# Patient Record
Sex: MALE | Race: WHITE
[De-identification: names, ages, dates, MRNs, and addresses within clinical notes are randomized per-mention and may not be internally consistent; named-entity substitution may affect disease eponyms.]

---

## 2017-05-15 ENCOUNTER — HOSPITAL ENCOUNTER (EMERGENCY)
Dept: HOSPITAL 8 - ED | Age: 82
End: 2017-05-15
Payer: MEDICARE

## 2017-05-15 VITALS — BODY MASS INDEX: 23.96 KG/M2 | WEIGHT: 180.78 LBS | HEIGHT: 73 IN

## 2017-05-15 VITALS — DIASTOLIC BLOOD PRESSURE: 67 MMHG | SYSTOLIC BLOOD PRESSURE: 102 MMHG

## 2017-05-15 DIAGNOSIS — Z85.46: ICD-10-CM

## 2017-05-15 DIAGNOSIS — L50.9: Primary | ICD-10-CM

## 2017-05-15 DIAGNOSIS — I10: ICD-10-CM

## 2017-05-15 PROCEDURE — 99284 EMERGENCY DEPT VISIT MOD MDM: CPT

## 2017-05-15 PROCEDURE — 96372 THER/PROPH/DIAG INJ SC/IM: CPT

## 2017-05-15 PROCEDURE — 93005 ELECTROCARDIOGRAM TRACING: CPT

## 2017-06-04 ENCOUNTER — HOSPITAL ENCOUNTER (INPATIENT)
Dept: HOSPITAL 8 - ED | Age: 82
LOS: 6 days | Discharge: HOME | DRG: 312 | End: 2017-06-10
Attending: INTERNAL MEDICINE | Admitting: INTERNAL MEDICINE
Payer: MEDICARE

## 2017-06-04 VITALS — BODY MASS INDEX: 22.75 KG/M2 | WEIGHT: 167.99 LBS | HEIGHT: 72 IN

## 2017-06-04 VITALS — DIASTOLIC BLOOD PRESSURE: 66 MMHG | SYSTOLIC BLOOD PRESSURE: 126 MMHG

## 2017-06-04 VITALS — SYSTOLIC BLOOD PRESSURE: 145 MMHG | DIASTOLIC BLOOD PRESSURE: 83 MMHG

## 2017-06-04 DIAGNOSIS — Z85.810: ICD-10-CM

## 2017-06-04 DIAGNOSIS — R78.81: ICD-10-CM

## 2017-06-04 DIAGNOSIS — E86.0: ICD-10-CM

## 2017-06-04 DIAGNOSIS — C78.01: ICD-10-CM

## 2017-06-04 DIAGNOSIS — K52.9: ICD-10-CM

## 2017-06-04 DIAGNOSIS — Z79.899: ICD-10-CM

## 2017-06-04 DIAGNOSIS — Z90.49: ICD-10-CM

## 2017-06-04 DIAGNOSIS — Z85.46: ICD-10-CM

## 2017-06-04 DIAGNOSIS — I42.9: ICD-10-CM

## 2017-06-04 DIAGNOSIS — I13.0: ICD-10-CM

## 2017-06-04 DIAGNOSIS — T44.6X5A: ICD-10-CM

## 2017-06-04 DIAGNOSIS — K21.9: ICD-10-CM

## 2017-06-04 DIAGNOSIS — I50.22: ICD-10-CM

## 2017-06-04 DIAGNOSIS — Z80.0: ICD-10-CM

## 2017-06-04 DIAGNOSIS — I65.23: ICD-10-CM

## 2017-06-04 DIAGNOSIS — Z85.828: ICD-10-CM

## 2017-06-04 DIAGNOSIS — R91.1: ICD-10-CM

## 2017-06-04 DIAGNOSIS — Z95.3: ICD-10-CM

## 2017-06-04 DIAGNOSIS — N28.1: ICD-10-CM

## 2017-06-04 DIAGNOSIS — Z95.2: ICD-10-CM

## 2017-06-04 DIAGNOSIS — I49.5: ICD-10-CM

## 2017-06-04 DIAGNOSIS — Z80.7: ICD-10-CM

## 2017-06-04 DIAGNOSIS — Z66: ICD-10-CM

## 2017-06-04 DIAGNOSIS — N18.2: ICD-10-CM

## 2017-06-04 DIAGNOSIS — Z81.8: ICD-10-CM

## 2017-06-04 DIAGNOSIS — I95.1: Primary | ICD-10-CM

## 2017-06-04 DIAGNOSIS — Z79.82: ICD-10-CM

## 2017-06-04 DIAGNOSIS — T46.4X5A: ICD-10-CM

## 2017-06-04 DIAGNOSIS — T44.7X5A: ICD-10-CM

## 2017-06-04 DIAGNOSIS — Z87.891: ICD-10-CM

## 2017-06-04 DIAGNOSIS — Z95.0: ICD-10-CM

## 2017-06-04 LAB
AST SERPL-CCNC: 20 U/L (ref 15–37)
BUN SERPL-MCNC: 23 MG/DL (ref 7–18)
IS PT STATUS REG ER OR PRE ER?: NO

## 2017-06-04 PROCEDURE — 74177 CT ABD & PELVIS W/CONTRAST: CPT

## 2017-06-04 PROCEDURE — 87076 CULTURE ANAEROBE IDENT EACH: CPT

## 2017-06-04 PROCEDURE — 36415 COLL VENOUS BLD VENIPUNCTURE: CPT

## 2017-06-04 PROCEDURE — 76700 US EXAM ABDOM COMPLETE: CPT

## 2017-06-04 PROCEDURE — 83690 ASSAY OF LIPASE: CPT

## 2017-06-04 PROCEDURE — 93005 ELECTROCARDIOGRAM TRACING: CPT

## 2017-06-04 PROCEDURE — 80048 BASIC METABOLIC PNL TOTAL CA: CPT

## 2017-06-04 PROCEDURE — 93306 TTE W/DOPPLER COMPLETE: CPT

## 2017-06-04 PROCEDURE — 71010: CPT

## 2017-06-04 PROCEDURE — 80053 COMPREHEN METABOLIC PANEL: CPT

## 2017-06-04 PROCEDURE — 87324 CLOSTRIDIUM AG IA: CPT

## 2017-06-04 PROCEDURE — 87040 BLOOD CULTURE FOR BACTERIA: CPT

## 2017-06-04 PROCEDURE — 89055 LEUKOCYTE ASSESSMENT FECAL: CPT

## 2017-06-04 PROCEDURE — 96360 HYDRATION IV INFUSION INIT: CPT

## 2017-06-04 PROCEDURE — 71260 CT THORAX DX C+: CPT

## 2017-06-04 PROCEDURE — 85025 COMPLETE CBC W/AUTO DIFF WBC: CPT

## 2017-06-04 PROCEDURE — 85610 PROTHROMBIN TIME: CPT

## 2017-06-04 PROCEDURE — 84484 ASSAY OF TROPONIN QUANT: CPT

## 2017-06-04 PROCEDURE — 83605 ASSAY OF LACTIC ACID: CPT

## 2017-06-04 PROCEDURE — 81003 URINALYSIS AUTO W/O SCOPE: CPT

## 2017-06-04 PROCEDURE — 84439 ASSAY OF FREE THYROXINE: CPT

## 2017-06-04 PROCEDURE — 85730 THROMBOPLASTIN TIME PARTIAL: CPT

## 2017-06-04 PROCEDURE — 84443 ASSAY THYROID STIM HORMONE: CPT

## 2017-06-04 PROCEDURE — 83735 ASSAY OF MAGNESIUM: CPT

## 2017-06-04 PROCEDURE — 82040 ASSAY OF SERUM ALBUMIN: CPT

## 2017-06-04 PROCEDURE — 85379 FIBRIN DEGRADATION QUANT: CPT

## 2017-06-04 PROCEDURE — 93880 EXTRACRANIAL BILAT STUDY: CPT

## 2017-06-04 RX ADMIN — SUCRALFATE SCH GM: 1 TABLET ORAL at 20:17

## 2017-06-04 RX ADMIN — TAMSULOSIN HYDROCHLORIDE SCH MG: 0.4 CAPSULE ORAL at 20:17

## 2017-06-04 RX ADMIN — POTASSIUM CHLORIDE SCH MLS/HR: 2 INJECTION, SOLUTION, CONCENTRATE INTRAVENOUS at 19:41

## 2017-06-05 VITALS — DIASTOLIC BLOOD PRESSURE: 57 MMHG | SYSTOLIC BLOOD PRESSURE: 103 MMHG

## 2017-06-05 LAB
AST SERPL-CCNC: 18 U/L (ref 15–37)
BUN SERPL-MCNC: 19 MG/DL (ref 7–18)
IS PT STATUS REG ER OR PRE ER?: NO

## 2017-06-05 RX ADMIN — HEPARIN SODIUM SCH UNITS: 5000 INJECTION, SOLUTION INTRAVENOUS; SUBCUTANEOUS at 12:00

## 2017-06-05 RX ADMIN — HEPARIN SODIUM SCH UNITS: 5000 INJECTION, SOLUTION INTRAVENOUS; SUBCUTANEOUS at 22:00

## 2017-06-05 RX ADMIN — PANTOPRAZOLE SODIUM SCH MG: 40 TABLET, DELAYED RELEASE ORAL at 08:00

## 2017-06-05 RX ADMIN — SUCRALFATE SCH GM: 1 TABLET ORAL at 22:00

## 2017-06-05 RX ADMIN — ASPIRIN SCH MG: 81 TABLET, COATED ORAL at 08:00

## 2017-06-05 RX ADMIN — POTASSIUM CHLORIDE SCH MLS/HR: 2 INJECTION, SOLUTION, CONCENTRATE INTRAVENOUS at 12:00

## 2017-06-05 RX ADMIN — POTASSIUM CHLORIDE SCH MLS/HR: 2 INJECTION, SOLUTION, CONCENTRATE INTRAVENOUS at 02:48

## 2017-06-05 RX ADMIN — TAMSULOSIN HYDROCHLORIDE SCH MG: 0.4 CAPSULE ORAL at 22:00

## 2017-06-05 RX ADMIN — POTASSIUM CHLORIDE SCH MLS/HR: 2 INJECTION, SOLUTION, CONCENTRATE INTRAVENOUS at 16:00

## 2017-06-05 RX ADMIN — HEPARIN SODIUM SCH UNITS: 5000 INJECTION, SOLUTION INTRAVENOUS; SUBCUTANEOUS at 04:18

## 2017-06-05 RX ADMIN — HEPARIN SODIUM SCH UNITS: 5000 INJECTION, SOLUTION INTRAVENOUS; SUBCUTANEOUS at 04:10

## 2017-06-05 RX ADMIN — SUCRALFATE SCH GM: 1 TABLET ORAL at 08:00

## 2017-06-06 VITALS — SYSTOLIC BLOOD PRESSURE: 107 MMHG | DIASTOLIC BLOOD PRESSURE: 66 MMHG

## 2017-06-06 VITALS — SYSTOLIC BLOOD PRESSURE: 120 MMHG | DIASTOLIC BLOOD PRESSURE: 72 MMHG

## 2017-06-06 VITALS — DIASTOLIC BLOOD PRESSURE: 75 MMHG | SYSTOLIC BLOOD PRESSURE: 137 MMHG

## 2017-06-06 VITALS — SYSTOLIC BLOOD PRESSURE: 91 MMHG | DIASTOLIC BLOOD PRESSURE: 52 MMHG

## 2017-06-06 VITALS — DIASTOLIC BLOOD PRESSURE: 84 MMHG | SYSTOLIC BLOOD PRESSURE: 129 MMHG

## 2017-06-06 RX ADMIN — POTASSIUM CHLORIDE SCH MLS/HR: 2 INJECTION, SOLUTION, CONCENTRATE INTRAVENOUS at 09:00

## 2017-06-06 RX ADMIN — SUCRALFATE SCH GM: 1 TABLET ORAL at 21:47

## 2017-06-06 RX ADMIN — TAMSULOSIN HYDROCHLORIDE SCH MG: 0.4 CAPSULE ORAL at 21:47

## 2017-06-06 RX ADMIN — ASPIRIN SCH MG: 81 TABLET, COATED ORAL at 09:00

## 2017-06-06 RX ADMIN — SUCRALFATE SCH GM: 1 TABLET ORAL at 09:00

## 2017-06-06 RX ADMIN — HEPARIN SODIUM SCH UNITS: 5000 INJECTION, SOLUTION INTRAVENOUS; SUBCUTANEOUS at 04:00

## 2017-06-06 RX ADMIN — PANTOPRAZOLE SODIUM SCH MG: 40 TABLET, DELAYED RELEASE ORAL at 09:00

## 2017-06-06 RX ADMIN — HEPARIN SODIUM SCH UNITS: 5000 INJECTION, SOLUTION INTRAVENOUS; SUBCUTANEOUS at 21:48

## 2017-06-06 RX ADMIN — ASPIRIN SCH MG: 81 TABLET, COATED ORAL at 08:00

## 2017-06-06 RX ADMIN — CEFTRIAXONE SCH MLS/HR: 1 INJECTION, POWDER, FOR SOLUTION INTRAMUSCULAR; INTRAVENOUS at 21:48

## 2017-06-06 RX ADMIN — POTASSIUM CHLORIDE SCH MLS/HR: 2 INJECTION, SOLUTION, CONCENTRATE INTRAVENOUS at 16:23

## 2017-06-07 VITALS — SYSTOLIC BLOOD PRESSURE: 146 MMHG | DIASTOLIC BLOOD PRESSURE: 88 MMHG

## 2017-06-07 VITALS — SYSTOLIC BLOOD PRESSURE: 90 MMHG | DIASTOLIC BLOOD PRESSURE: 81 MMHG

## 2017-06-07 VITALS — SYSTOLIC BLOOD PRESSURE: 141 MMHG | DIASTOLIC BLOOD PRESSURE: 89 MMHG

## 2017-06-07 VITALS — SYSTOLIC BLOOD PRESSURE: 86 MMHG | DIASTOLIC BLOOD PRESSURE: 51 MMHG

## 2017-06-07 VITALS — DIASTOLIC BLOOD PRESSURE: 77 MMHG | SYSTOLIC BLOOD PRESSURE: 127 MMHG

## 2017-06-07 VITALS — DIASTOLIC BLOOD PRESSURE: 64 MMHG | SYSTOLIC BLOOD PRESSURE: 103 MMHG

## 2017-06-07 VITALS — SYSTOLIC BLOOD PRESSURE: 141 MMHG | DIASTOLIC BLOOD PRESSURE: 80 MMHG

## 2017-06-07 VITALS — DIASTOLIC BLOOD PRESSURE: 86 MMHG | SYSTOLIC BLOOD PRESSURE: 137 MMHG

## 2017-06-07 RX ADMIN — HEPARIN SODIUM SCH UNITS: 5000 INJECTION, SOLUTION INTRAVENOUS; SUBCUTANEOUS at 03:39

## 2017-06-07 RX ADMIN — SODIUM CHLORIDE SCH MLS/HR: 0.9 INJECTION, SOLUTION INTRAVENOUS at 10:31

## 2017-06-07 RX ADMIN — SODIUM CHLORIDE SCH MLS/HR: 0.9 INJECTION, SOLUTION INTRAVENOUS at 21:09

## 2017-06-07 RX ADMIN — POTASSIUM CHLORIDE SCH MLS/HR: 2 INJECTION, SOLUTION, CONCENTRATE INTRAVENOUS at 01:40

## 2017-06-07 RX ADMIN — CEFTRIAXONE SCH MLS/HR: 1 INJECTION, POWDER, FOR SOLUTION INTRAMUSCULAR; INTRAVENOUS at 21:09

## 2017-06-08 VITALS — SYSTOLIC BLOOD PRESSURE: 94 MMHG | DIASTOLIC BLOOD PRESSURE: 54 MMHG

## 2017-06-08 VITALS — DIASTOLIC BLOOD PRESSURE: 85 MMHG | SYSTOLIC BLOOD PRESSURE: 146 MMHG

## 2017-06-08 VITALS — SYSTOLIC BLOOD PRESSURE: 133 MMHG | DIASTOLIC BLOOD PRESSURE: 94 MMHG

## 2017-06-08 VITALS — DIASTOLIC BLOOD PRESSURE: 71 MMHG | SYSTOLIC BLOOD PRESSURE: 120 MMHG

## 2017-06-08 VITALS — DIASTOLIC BLOOD PRESSURE: 74 MMHG | SYSTOLIC BLOOD PRESSURE: 130 MMHG

## 2017-06-08 VITALS — DIASTOLIC BLOOD PRESSURE: 94 MMHG | SYSTOLIC BLOOD PRESSURE: 148 MMHG

## 2017-06-08 VITALS — SYSTOLIC BLOOD PRESSURE: 83 MMHG | DIASTOLIC BLOOD PRESSURE: 56 MMHG

## 2017-06-08 VITALS — SYSTOLIC BLOOD PRESSURE: 90 MMHG | DIASTOLIC BLOOD PRESSURE: 60 MMHG

## 2017-06-08 LAB
BUN SERPL-MCNC: 11 MG/DL (ref 7–18)
BUN SERPL-MCNC: 11 MG/DL (ref 7–18)

## 2017-06-08 RX ADMIN — DOCUSATE SODIUM PRN MG: 100 CAPSULE, LIQUID FILLED ORAL at 20:52

## 2017-06-08 RX ADMIN — FLUDROCORTISONE ACETATE SCH MG: 0.1 TABLET ORAL at 17:09

## 2017-06-08 RX ADMIN — ASPIRIN SCH MG: 81 TABLET, COATED ORAL at 09:52

## 2017-06-08 RX ADMIN — HEPARIN SODIUM SCH UNITS: 5000 INJECTION, SOLUTION INTRAVENOUS; SUBCUTANEOUS at 20:47

## 2017-06-08 RX ADMIN — SUCRALFATE SCH GM: 1 TABLET ORAL at 20:45

## 2017-06-08 RX ADMIN — PANTOPRAZOLE SODIUM SCH MG: 40 TABLET, DELAYED RELEASE ORAL at 09:53

## 2017-06-08 RX ADMIN — SUCRALFATE SCH GM: 1 TABLET ORAL at 09:00

## 2017-06-08 RX ADMIN — SODIUM CHLORIDE SCH MLS/HR: 0.9 INJECTION, SOLUTION INTRAVENOUS at 07:30

## 2017-06-08 RX ADMIN — FLUDROCORTISONE ACETATE SCH MG: 0.1 TABLET ORAL at 09:50

## 2017-06-08 RX ADMIN — FLUDROCORTISONE ACETATE SCH MG: 0.1 TABLET ORAL at 20:46

## 2017-06-08 RX ADMIN — SODIUM CHLORIDE SCH MLS/HR: 0.9 INJECTION, SOLUTION INTRAVENOUS at 20:45

## 2017-06-08 RX ADMIN — HEPARIN SODIUM SCH UNITS: 5000 INJECTION, SOLUTION INTRAVENOUS; SUBCUTANEOUS at 13:11

## 2017-06-08 RX ADMIN — CEFTRIAXONE SCH MLS/HR: 1 INJECTION, POWDER, FOR SOLUTION INTRAMUSCULAR; INTRAVENOUS at 20:47

## 2017-06-09 VITALS — DIASTOLIC BLOOD PRESSURE: 95 MMHG | SYSTOLIC BLOOD PRESSURE: 110 MMHG

## 2017-06-09 VITALS — SYSTOLIC BLOOD PRESSURE: 88 MMHG | DIASTOLIC BLOOD PRESSURE: 54 MMHG

## 2017-06-09 VITALS — DIASTOLIC BLOOD PRESSURE: 86 MMHG | SYSTOLIC BLOOD PRESSURE: 151 MMHG

## 2017-06-09 VITALS — DIASTOLIC BLOOD PRESSURE: 76 MMHG | SYSTOLIC BLOOD PRESSURE: 147 MMHG

## 2017-06-09 LAB — BUN SERPL-MCNC: 9 MG/DL (ref 7–18)

## 2017-06-09 RX ADMIN — PANTOPRAZOLE SODIUM SCH MG: 40 TABLET, DELAYED RELEASE ORAL at 10:13

## 2017-06-09 RX ADMIN — FLUDROCORTISONE ACETATE SCH MG: 0.1 TABLET ORAL at 20:45

## 2017-06-09 RX ADMIN — FLUDROCORTISONE ACETATE SCH MG: 0.1 TABLET ORAL at 17:32

## 2017-06-09 RX ADMIN — DOCUSATE SODIUM PRN MG: 100 CAPSULE, LIQUID FILLED ORAL at 20:46

## 2017-06-09 RX ADMIN — SUCRALFATE SCH GM: 1 TABLET ORAL at 10:13

## 2017-06-09 RX ADMIN — HEPARIN SODIUM SCH UNITS: 5000 INJECTION, SOLUTION INTRAVENOUS; SUBCUTANEOUS at 20:46

## 2017-06-09 RX ADMIN — ASPIRIN SCH MG: 81 TABLET, COATED ORAL at 10:13

## 2017-06-09 RX ADMIN — HEPARIN SODIUM SCH UNITS: 5000 INJECTION, SOLUTION INTRAVENOUS; SUBCUTANEOUS at 14:07

## 2017-06-09 RX ADMIN — SUCRALFATE SCH GM: 1 TABLET ORAL at 20:46

## 2017-06-09 RX ADMIN — HEPARIN SODIUM SCH UNITS: 5000 INJECTION, SOLUTION INTRAVENOUS; SUBCUTANEOUS at 05:23

## 2017-06-09 RX ADMIN — SODIUM CHLORIDE SCH MLS/HR: 0.9 INJECTION, SOLUTION INTRAVENOUS at 05:23

## 2017-06-09 RX ADMIN — FLUDROCORTISONE ACETATE SCH MG: 0.1 TABLET ORAL at 10:12

## 2017-06-10 VITALS — DIASTOLIC BLOOD PRESSURE: 90 MMHG | SYSTOLIC BLOOD PRESSURE: 95 MMHG

## 2017-06-10 VITALS — DIASTOLIC BLOOD PRESSURE: 52 MMHG | SYSTOLIC BLOOD PRESSURE: 131 MMHG

## 2017-06-10 VITALS — DIASTOLIC BLOOD PRESSURE: 87 MMHG | SYSTOLIC BLOOD PRESSURE: 108 MMHG

## 2017-06-10 RX ADMIN — PANTOPRAZOLE SODIUM SCH MG: 40 TABLET, DELAYED RELEASE ORAL at 08:19

## 2017-06-10 RX ADMIN — SUCRALFATE SCH GM: 1 TABLET ORAL at 08:17

## 2017-06-10 RX ADMIN — ASPIRIN SCH MG: 81 TABLET, COATED ORAL at 08:18

## 2017-06-10 RX ADMIN — HEPARIN SODIUM SCH UNITS: 5000 INJECTION, SOLUTION INTRAVENOUS; SUBCUTANEOUS at 05:33

## 2017-06-10 RX ADMIN — FLUDROCORTISONE ACETATE SCH MG: 0.1 TABLET ORAL at 08:18

## 2018-04-10 ENCOUNTER — HOSPITAL ENCOUNTER (OUTPATIENT)
Dept: HOSPITAL 8 - CFH | Age: 83
Discharge: HOME | End: 2018-04-10
Attending: OTOLARYNGOLOGY
Payer: MEDICARE

## 2018-04-10 DIAGNOSIS — H90.3: ICD-10-CM

## 2018-04-10 DIAGNOSIS — G93.89: Primary | ICD-10-CM

## 2018-04-10 PROCEDURE — 70480 CT ORBIT/EAR/FOSSA W/O DYE: CPT

## 2018-12-13 ENCOUNTER — HOSPITAL ENCOUNTER (OUTPATIENT)
Dept: HOSPITAL 8 - PETCFH | Age: 83
Discharge: HOME | End: 2018-12-13
Attending: UROLOGY
Payer: MEDICARE

## 2018-12-13 DIAGNOSIS — M89.9: Primary | ICD-10-CM

## 2018-12-13 DIAGNOSIS — C61: ICD-10-CM

## 2018-12-13 PROCEDURE — A9503 TC99M MEDRONATE: HCPCS

## 2018-12-13 PROCEDURE — 78306 BONE IMAGING WHOLE BODY: CPT

## 2018-12-21 ENCOUNTER — HOSPITAL ENCOUNTER (OUTPATIENT)
Dept: HOSPITAL 8 - CFH | Age: 83
Discharge: HOME | End: 2018-12-21
Attending: UROLOGY
Payer: MEDICARE

## 2018-12-21 DIAGNOSIS — K76.89: ICD-10-CM

## 2018-12-21 DIAGNOSIS — M81.0: ICD-10-CM

## 2018-12-21 DIAGNOSIS — Z13.820: Primary | ICD-10-CM

## 2018-12-21 DIAGNOSIS — K44.9: ICD-10-CM

## 2018-12-21 DIAGNOSIS — M47.894: ICD-10-CM

## 2018-12-21 DIAGNOSIS — K57.30: ICD-10-CM

## 2018-12-21 DIAGNOSIS — C61: ICD-10-CM

## 2018-12-21 PROCEDURE — 74177 CT ABD & PELVIS W/CONTRAST: CPT

## 2018-12-21 PROCEDURE — 77080 DXA BONE DENSITY AXIAL: CPT

## 2019-05-23 ENCOUNTER — HOSPITAL ENCOUNTER (OUTPATIENT)
Dept: HOSPITAL 8 - CFH | Age: 84
Discharge: HOME | End: 2019-05-23
Attending: INTERNAL MEDICINE
Payer: MEDICARE

## 2019-05-23 DIAGNOSIS — I10: ICD-10-CM

## 2019-05-23 DIAGNOSIS — Z95.4: ICD-10-CM

## 2019-05-23 DIAGNOSIS — I08.8: Primary | ICD-10-CM

## 2019-05-23 PROCEDURE — 93306 TTE W/DOPPLER COMPLETE: CPT

## 2020-10-24 ENCOUNTER — APPOINTMENT (OUTPATIENT)
Dept: CARDIOLOGY | Facility: MEDICAL CENTER | Age: 85
End: 2020-10-24
Attending: STUDENT IN AN ORGANIZED HEALTH CARE EDUCATION/TRAINING PROGRAM
Payer: MEDICARE

## 2020-10-24 ENCOUNTER — APPOINTMENT (OUTPATIENT)
Dept: RADIOLOGY | Facility: MEDICAL CENTER | Age: 85
End: 2020-10-24
Attending: EMERGENCY MEDICINE
Payer: MEDICARE

## 2020-10-24 ENCOUNTER — HOSPITAL ENCOUNTER (OUTPATIENT)
Facility: MEDICAL CENTER | Age: 85
End: 2020-10-26
Attending: EMERGENCY MEDICINE | Admitting: STUDENT IN AN ORGANIZED HEALTH CARE EDUCATION/TRAINING PROGRAM
Payer: MEDICARE

## 2020-10-24 DIAGNOSIS — R41.0 DISORIENTATION: ICD-10-CM

## 2020-10-24 DIAGNOSIS — R55 SYNCOPE, UNSPECIFIED SYNCOPE TYPE: ICD-10-CM

## 2020-10-24 PROBLEM — I48.0 PAROXYSMAL ATRIAL FIBRILLATION (HCC): Status: ACTIVE | Noted: 2020-10-24

## 2020-10-24 PROBLEM — Z95.0 HISTORY OF PERMANENT CARDIAC PACEMAKER PLACEMENT: Status: ACTIVE | Noted: 2020-10-24

## 2020-10-24 LAB
ALBUMIN SERPL BCP-MCNC: 4.3 G/DL (ref 3.2–4.9)
ALBUMIN/GLOB SERPL: 1.7 G/DL
ALP SERPL-CCNC: 56 U/L (ref 30–99)
ALT SERPL-CCNC: 16 U/L (ref 2–50)
ANION GAP SERPL CALC-SCNC: 14 MMOL/L (ref 7–16)
APPEARANCE UR: CLEAR
APTT PPP: 32.5 SEC (ref 24.7–36)
AST SERPL-CCNC: 20 U/L (ref 12–45)
BASOPHILS # BLD AUTO: 1 % (ref 0–1.8)
BASOPHILS # BLD: 0.06 K/UL (ref 0–0.12)
BILIRUB SERPL-MCNC: 0.9 MG/DL (ref 0.1–1.5)
BILIRUB UR QL STRIP.AUTO: NEGATIVE
BUN SERPL-MCNC: 18 MG/DL (ref 8–22)
CALCIUM SERPL-MCNC: 9.3 MG/DL (ref 8.5–10.5)
CHLORIDE SERPL-SCNC: 97 MMOL/L (ref 96–112)
CO2 SERPL-SCNC: 21 MMOL/L (ref 20–33)
COLOR UR: YELLOW
CREAT SERPL-MCNC: 0.81 MG/DL (ref 0.5–1.4)
EKG IMPRESSION: NORMAL
EOSINOPHIL # BLD AUTO: 0.14 K/UL (ref 0–0.51)
EOSINOPHIL NFR BLD: 2.3 % (ref 0–6.9)
ERYTHROCYTE [DISTWIDTH] IN BLOOD BY AUTOMATED COUNT: 47.7 FL (ref 35.9–50)
GLOBULIN SER CALC-MCNC: 2.5 G/DL (ref 1.9–3.5)
GLUCOSE SERPL-MCNC: 145 MG/DL (ref 65–99)
GLUCOSE UR STRIP.AUTO-MCNC: NEGATIVE MG/DL
HCT VFR BLD AUTO: 40 % (ref 42–52)
HGB BLD-MCNC: 12.7 G/DL (ref 14–18)
IMM GRANULOCYTES # BLD AUTO: 0.02 K/UL (ref 0–0.11)
IMM GRANULOCYTES NFR BLD AUTO: 0.3 % (ref 0–0.9)
INR PPP: 1.74 (ref 0.87–1.13)
KETONES UR STRIP.AUTO-MCNC: ABNORMAL MG/DL
LEUKOCYTE ESTERASE UR QL STRIP.AUTO: NEGATIVE
LYMPHOCYTES # BLD AUTO: 1.18 K/UL (ref 1–4.8)
LYMPHOCYTES NFR BLD: 19.5 % (ref 22–41)
MCH RBC QN AUTO: 30.1 PG (ref 27–33)
MCHC RBC AUTO-ENTMCNC: 31.8 G/DL (ref 33.7–35.3)
MCV RBC AUTO: 94.8 FL (ref 81.4–97.8)
MICRO URNS: ABNORMAL
MONOCYTES # BLD AUTO: 0.64 K/UL (ref 0–0.85)
MONOCYTES NFR BLD AUTO: 10.6 % (ref 0–13.4)
NEUTROPHILS # BLD AUTO: 4.02 K/UL (ref 1.82–7.42)
NEUTROPHILS NFR BLD: 66.3 % (ref 44–72)
NITRITE UR QL STRIP.AUTO: NEGATIVE
NRBC # BLD AUTO: 0 K/UL
NRBC BLD-RTO: 0 /100 WBC
PH UR STRIP.AUTO: 5.5 [PH] (ref 5–8)
PLATELET # BLD AUTO: 211 K/UL (ref 164–446)
PMV BLD AUTO: 9.9 FL (ref 9–12.9)
POTASSIUM SERPL-SCNC: 4.3 MMOL/L (ref 3.6–5.5)
PROT SERPL-MCNC: 6.8 G/DL (ref 6–8.2)
PROT UR QL STRIP: NEGATIVE MG/DL
PROTHROMBIN TIME: 20.8 SEC (ref 12–14.6)
RBC # BLD AUTO: 4.22 M/UL (ref 4.7–6.1)
RBC UR QL AUTO: NEGATIVE
SODIUM SERPL-SCNC: 132 MMOL/L (ref 135–145)
SP GR UR STRIP.AUTO: 1.02
TROPONIN T SERPL-MCNC: 49 NG/L (ref 6–19)
UROBILINOGEN UR STRIP.AUTO-MCNC: 0.2 MG/DL
WBC # BLD AUTO: 6.1 K/UL (ref 4.8–10.8)

## 2020-10-24 PROCEDURE — 70450 CT HEAD/BRAIN W/O DYE: CPT

## 2020-10-24 PROCEDURE — 80053 COMPREHEN METABOLIC PANEL: CPT

## 2020-10-24 PROCEDURE — 96374 THER/PROPH/DIAG INJ IV PUSH: CPT

## 2020-10-24 PROCEDURE — G0378 HOSPITAL OBSERVATION PER HR: HCPCS

## 2020-10-24 PROCEDURE — 700102 HCHG RX REV CODE 250 W/ 637 OVERRIDE(OP): Performed by: STUDENT IN AN ORGANIZED HEALTH CARE EDUCATION/TRAINING PROGRAM

## 2020-10-24 PROCEDURE — 93306 TTE W/DOPPLER COMPLETE: CPT

## 2020-10-24 PROCEDURE — 71045 X-RAY EXAM CHEST 1 VIEW: CPT

## 2020-10-24 PROCEDURE — 85025 COMPLETE CBC W/AUTO DIFF WBC: CPT

## 2020-10-24 PROCEDURE — 700111 HCHG RX REV CODE 636 W/ 250 OVERRIDE (IP)

## 2020-10-24 PROCEDURE — C9803 HOPD COVID-19 SPEC COLLECT: HCPCS | Performed by: HOSPITALIST

## 2020-10-24 PROCEDURE — 99220 PR INITIAL OBSERVATION CARE,LEVL III: CPT | Mod: AI | Performed by: STUDENT IN AN ORGANIZED HEALTH CARE EDUCATION/TRAINING PROGRAM

## 2020-10-24 PROCEDURE — 700105 HCHG RX REV CODE 258: Performed by: STUDENT IN AN ORGANIZED HEALTH CARE EDUCATION/TRAINING PROGRAM

## 2020-10-24 PROCEDURE — 93005 ELECTROCARDIOGRAM TRACING: CPT | Performed by: EMERGENCY MEDICINE

## 2020-10-24 PROCEDURE — 85730 THROMBOPLASTIN TIME PARTIAL: CPT

## 2020-10-24 PROCEDURE — A9270 NON-COVERED ITEM OR SERVICE: HCPCS | Performed by: STUDENT IN AN ORGANIZED HEALTH CARE EDUCATION/TRAINING PROGRAM

## 2020-10-24 PROCEDURE — 99285 EMERGENCY DEPT VISIT HI MDM: CPT

## 2020-10-24 PROCEDURE — 81003 URINALYSIS AUTO W/O SCOPE: CPT

## 2020-10-24 PROCEDURE — 84484 ASSAY OF TROPONIN QUANT: CPT

## 2020-10-24 PROCEDURE — 85610 PROTHROMBIN TIME: CPT

## 2020-10-24 RX ORDER — TAMSULOSIN HYDROCHLORIDE 0.4 MG/1
0.4 CAPSULE ORAL
Status: DISCONTINUED | OUTPATIENT
Start: 2020-10-25 | End: 2020-10-26 | Stop reason: HOSPADM

## 2020-10-24 RX ORDER — LISINOPRIL 10 MG/1
10 TABLET ORAL DAILY
Status: DISCONTINUED | OUTPATIENT
Start: 2020-10-24 | End: 2020-10-24

## 2020-10-24 RX ORDER — POLYETHYLENE GLYCOL 3350 17 G/17G
1 POWDER, FOR SOLUTION ORAL
Status: DISCONTINUED | OUTPATIENT
Start: 2020-10-24 | End: 2020-10-26 | Stop reason: HOSPADM

## 2020-10-24 RX ORDER — BISACODYL 10 MG
10 SUPPOSITORY, RECTAL RECTAL
Status: DISCONTINUED | OUTPATIENT
Start: 2020-10-24 | End: 2020-10-26 | Stop reason: HOSPADM

## 2020-10-24 RX ORDER — HYDROCHLOROTHIAZIDE 12.5 MG/1
12.5 TABLET ORAL
Status: DISCONTINUED | OUTPATIENT
Start: 2020-10-24 | End: 2020-10-24

## 2020-10-24 RX ORDER — ONDANSETRON 4 MG/1
4 TABLET, ORALLY DISINTEGRATING ORAL EVERY 4 HOURS PRN
Status: DISCONTINUED | OUTPATIENT
Start: 2020-10-24 | End: 2020-10-24

## 2020-10-24 RX ORDER — METOPROLOL SUCCINATE 50 MG/1
50 TABLET, EXTENDED RELEASE ORAL DAILY
Status: DISCONTINUED | OUTPATIENT
Start: 2020-10-25 | End: 2020-10-26 | Stop reason: HOSPADM

## 2020-10-24 RX ORDER — ONDANSETRON 2 MG/ML
4 INJECTION INTRAMUSCULAR; INTRAVENOUS EVERY 4 HOURS PRN
Status: DISCONTINUED | OUTPATIENT
Start: 2020-10-24 | End: 2020-10-24

## 2020-10-24 RX ORDER — ONDANSETRON 2 MG/ML
INJECTION INTRAMUSCULAR; INTRAVENOUS
Status: COMPLETED
Start: 2020-10-24 | End: 2020-10-24

## 2020-10-24 RX ORDER — LISINOPRIL 30 MG/1
30 TABLET ORAL DAILY
COMMUNITY

## 2020-10-24 RX ORDER — WARFARIN SODIUM 2.5 MG/1
2.5 TABLET ORAL EVERY EVENING
COMMUNITY

## 2020-10-24 RX ORDER — SODIUM CHLORIDE 9 MG/ML
INJECTION, SOLUTION INTRAVENOUS CONTINUOUS
Status: DISCONTINUED | OUTPATIENT
Start: 2020-10-24 | End: 2020-10-26 | Stop reason: HOSPADM

## 2020-10-24 RX ORDER — ONDANSETRON 2 MG/ML
4 INJECTION INTRAMUSCULAR; INTRAVENOUS ONCE
Status: COMPLETED | OUTPATIENT
Start: 2020-10-24 | End: 2020-10-24

## 2020-10-24 RX ORDER — ACETAMINOPHEN 325 MG/1
650 TABLET ORAL EVERY 6 HOURS PRN
Status: DISCONTINUED | OUTPATIENT
Start: 2020-10-24 | End: 2020-10-26 | Stop reason: HOSPADM

## 2020-10-24 RX ORDER — WARFARIN SODIUM 5 MG/1
5 TABLET ORAL
Status: COMPLETED | OUTPATIENT
Start: 2020-10-24 | End: 2020-10-24

## 2020-10-24 RX ORDER — AMOXICILLIN 250 MG
2 CAPSULE ORAL 2 TIMES DAILY
Status: DISCONTINUED | OUTPATIENT
Start: 2020-10-24 | End: 2020-10-26 | Stop reason: HOSPADM

## 2020-10-24 RX ORDER — ENALAPRILAT 1.25 MG/ML
1.25 INJECTION INTRAVENOUS EVERY 6 HOURS PRN
Status: DISCONTINUED | OUTPATIENT
Start: 2020-10-24 | End: 2020-10-26 | Stop reason: HOSPADM

## 2020-10-24 RX ADMIN — ONDANSETRON 4 MG: 2 INJECTION INTRAMUSCULAR; INTRAVENOUS at 12:43

## 2020-10-24 RX ADMIN — ASPIRIN 81 MG: 81 TABLET, COATED ORAL at 18:04

## 2020-10-24 RX ADMIN — WARFARIN SODIUM 5 MG: 5 TABLET ORAL at 22:16

## 2020-10-24 RX ADMIN — SODIUM CHLORIDE: 9 INJECTION, SOLUTION INTRAVENOUS at 18:04

## 2020-10-24 RX ADMIN — LISINOPRIL 30 MG: 20 TABLET ORAL at 18:08

## 2020-10-24 ASSESSMENT — PATIENT HEALTH QUESTIONNAIRE - PHQ9
SUM OF ALL RESPONSES TO PHQ9 QUESTIONS 1 AND 2: 0
1. LITTLE INTEREST OR PLEASURE IN DOING THINGS: NOT AT ALL
2. FEELING DOWN, DEPRESSED, IRRITABLE, OR HOPELESS: NOT AT ALL

## 2020-10-24 ASSESSMENT — CHA2DS2 SCORE
DIABETES: NO
HYPERTENSION: YES
AGE 65 TO 74: NO
AGE 75 OR GREATER: YES
CHF OR LEFT VENTRICULAR DYSFUNCTION: NO
CHA2DS2 VASC SCORE: 3
PRIOR STROKE OR TIA OR THROMBOEMBOLISM: NO
VASCULAR DISEASE: NO
SEX: MALE

## 2020-10-24 ASSESSMENT — ENCOUNTER SYMPTOMS
RESPIRATORY NEGATIVE: 1
MUSCULOSKELETAL NEGATIVE: 1
CARDIOVASCULAR NEGATIVE: 1
PSYCHIATRIC NEGATIVE: 1
LOSS OF CONSCIOUSNESS: 1
EYES NEGATIVE: 1
CONSTITUTIONAL NEGATIVE: 1

## 2020-10-24 NOTE — ASSESSMENT & PLAN NOTE
Continue home medication- lisinopril and metoprolol.  Rule out orthostatic hypotension.  Will closely monitor blood pressure.

## 2020-10-24 NOTE — ED TRIAGE NOTES
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 denies head trauma. friend was able to caught pt. has initial gcs of 3 x 2 minutes. arrived gcs=9     Pt aaox4. On coumadin.

## 2020-10-24 NOTE — ED NOTES
Assumed care, report received.  Pt oriented to name and birth date only.  Following simple commands at this time, SERNA, no unilateral weakness or deficits noted at this time.

## 2020-10-24 NOTE — ASSESSMENT & PLAN NOTE
Patient presented with syncope episode with loss of consciousness   History of aortic valve replacement, pacemaker, paroxysmal A. fib.   History of neck cancer with surgical resection  Patient has been on flowmax for over 10 years, do not suspect this to be precipitating factor    CT head with no acute pathology.    Carotid ultrasound pending to rule out subclavian steal syndrome  2D echo pending  Cardiology recommendations appreciated.

## 2020-10-24 NOTE — H&P
Hospital Medicine History & Physical Note    Date of Service  10/24/2020    Primary Care Physician  Gumaro Suazo M.D.    Consultants  none    Code Status  DNAR/DNI    Chief Complaint  Chief Complaint   Patient presents with   • Syncope     pt bib ems had a syncopal episode while helping friend do some welding, then pt felt dizzy and had syncopal episode. denies head trauma. friend was able to caught pt. has initial gcs of 3 x 2 minutes. arrived gcs=9       History of Presenting Illness  87 y.o. male who presented 10/24/2020 with syncope episode.  He has a past medical history significant for hypertension, aortic valve replacement, atrial fibrillation on Coumadin therapy and pacemaker placement.  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 States does not remember anything else. He denies having any presyncopal symptoms including shortness of breath, chest pain or palpitations, diaphoresis or excessive sweating prior.  Daughter at bedside states he has had chronic dizziness over the last several years but has never syncopized. She states she was told by his friend that he became dizzy and had loss of consciousness but was held from hitting the ground. He had no head trauma. He had no tongue biting, tremors of bowel/bladder incontinence.  Per EMS report, he was very disoriented and confused with a GCS of of 3 upon arrival to the scene but slowly recovered his mentation but was still very disoriented on route to the hospital. He was unable to provide any pertinent history and their examination had no focal finding/weakness.    On presentation here, CT of the head was without any head fracture/dislocation/stroke/brain bleed and  blood work was mostly unremarkable bedside a mild troponin elevation and hyponatremia.    On my examination, patient was back to his baseline and was conversating without any slurred speech/dysarthria, no facial droop noted, no  focal weakness or sensory deficit. He stated he was not aware of what happened and just woke up and found himself in the emergency room.  In terms of his significant cardiac history, he follows up with a cardiologist over [facility].     Review of Systems  Review of Systems   Constitutional: Negative.    HENT: Negative.    Eyes: Negative.    Respiratory: Negative.    Cardiovascular: Negative.    Genitourinary: Negative.    Musculoskeletal: Negative.    Skin: Negative.    Neurological: Positive for loss of consciousness.   Endo/Heme/Allergies: Negative.    Psychiatric/Behavioral: Negative.        Past Medical History   has a past medical history of Cancer, Hypertension, and Valvular heart disease.    Surgical History   has a past surgical history that includes tonsillectomy and adenoidectomy; cholecystectomy; inguinal hernia repair; knee arthroscopy; aortic valve replacement; and zzz cardiac cath.     Family History  family history is not on file.     Social History   reports that he has quit smoking. He does not have any smokeless tobacco history on file.    Allergies  No Known Allergies    Medications  Prior to Admission Medications   Prescriptions Last Dose Informant Patient Reported? Taking?   aspirin EC (ECOTRIN) 81 MG TBEC   Yes No   Sig: Take 81 mg by mouth every day.   hydrochlorothiazide (MICROZIDE) 12.5 MG capsule   No No   Sig: Take 1 Cap by mouth every day.   lisinopril (PRINIVIL) 10 MG TABS   No No   Sig: Take 1 Tab by mouth every day.   metoprolol SR (TOPROL XL) 50 MG TB24   No No   Sig: Take 1 Tab by mouth every day.   tamsulosin (FLOMAX) 0.4 MG capsule   Yes No   Sig: Take 0.4 mg by mouth ONE-HALF HOUR AFTER BREAKFAST.      Facility-Administered Medications: None       Physical Exam  Temp:  [36.6 °C (97.9 °F)] 36.6 °C (97.9 °F)  Pulse:  [31-61] 31  Resp:  [14-60] 16  BP: (143-165)/() 159/100  SpO2:  [94 %-97 %] 97 %    Physical Exam  Constitutional:       Appearance: Normal appearance.    HENT:      Head: Normocephalic and atraumatic.      Nose: Nose normal.   Eyes:      Extraocular Movements: Extraocular movements intact.      Conjunctiva/sclera: Conjunctivae normal.      Pupils: Pupils are equal, round, and reactive to light.   Neck:      Musculoskeletal: Normal range of motion and neck supple.   Cardiovascular:      Rate and Rhythm: Normal rate.      Pulses: Normal pulses.   Pulmonary:      Effort: Pulmonary effort is normal. No respiratory distress.      Breath sounds: Normal breath sounds. No stridor.   Abdominal:      General: Bowel sounds are normal. There is no distension.      Palpations: Abdomen is soft.      Tenderness: There is no abdominal tenderness.   Musculoskeletal: Normal range of motion.         General: No swelling or tenderness.   Skin:     General: Skin is dry.   Neurological:      General: No focal deficit present.      Mental Status: He is alert and oriented to person, place, and time. Mental status is at baseline.   Psychiatric:         Mood and Affect: Mood normal.         Behavior: Behavior normal.         Laboratory:  Recent Labs     10/24/20  1150   WBC 6.1   RBC 4.22*   HEMOGLOBIN 12.7*   HEMATOCRIT 40.0*   MCV 94.8   MCH 30.1   MCHC 31.8*   RDW 47.7   PLATELETCT 211   MPV 9.9     Recent Labs     10/24/20  1150   SODIUM 132*   POTASSIUM 4.3   CHLORIDE 97   CO2 21   GLUCOSE 145*   BUN 18   CREATININE 0.81   CALCIUM 9.3     Recent Labs     10/24/20  1150   ALTSGPT 16   ASTSGOT 20   ALKPHOSPHAT 56   TBILIRUBIN 0.9   GLUCOSE 145*     Recent Labs     10/24/20  1150   APTT 32.5   INR 1.74*     No results for input(s): NTPROBNP in the last 72 hours.      Recent Labs     10/24/20  1150   TROPONINT 49*       Imaging:  CT-HEAD W/O   Final Result      1.  Generalized atrophy and chronic microvascular ischemic type changes.   2.  No acute intracranial abnormality.      DX-CHEST-LIMITED (1 VIEW)   Final Result      1.  Cardiomegaly, cardiac pacer and valve prosthesis.   2.   Atherosclerosis.   3.  Mild interstitial prominence could represent mild vascular congestion or chronic changes. No pleural effusion.      EC-ECHOCARDIOGRAM COMPLETE W/ CONT    (Results Pending)         Assessment/Plan:  I anticipate this patient is appropriate for observation status at this time.    Syncope  Assessment & Plan  Patient presented with syncope episode with loss of consciousness and postictal state.  State he was out with a friend welding when he passed out.  Patient unaware of what happened but denies having any episodes of chest pain, palpitations, shortness of breath, nausea,vomiting or diarrhea.  Daughter at bedside states friend denies him having any tongue biting, bowel or bladder incontinence or tremors.   Patient with a history of aortic valve replacement and pacemaker placement.     CT head with no acute pathology.    Unclear etiology at this time but favor orthostatic syncope.    Admit to telemetry.  We will get a urinalysis.  PPM interrogation.  2D echo.  EEG.  Orthostatic vital signs.  IV fluid as needed.  May need PT/OT at discharge.    History of permanent cardiac pacemaker placement  Assessment & Plan  Patient states placed several years ago for symptomatic bradycardia.  With his presenting history of syncope, will get pacemaker interrogated.    Paroxysmal atrial fibrillation (HCC)  Assessment & Plan  Patient on metoprolol and Coumadin for anticoagulation.  Pharmacy to dose Coumadin.  Closely monitor INR.    S/p AVR replacement- (present on admission)  Assessment & Plan  History of aortic valve replacement few years ago.  Aspirin  Patient follows up with his cardiologist over at [facility].  We will get a 2D echo.    HTN (hypertension)- (present on admission)  Assessment & Plan  Continue home medication- lisinopril and metoprolol.  Rule out orthostatic hypotension.  Will closely monitor blood pressure.

## 2020-10-24 NOTE — ASSESSMENT & PLAN NOTE
History of aortic valve replacement few years ago.  Aspirin  Patient follows up with his cardiologist over at [facility].  We will get a 2D echo.

## 2020-10-24 NOTE — ASSESSMENT & PLAN NOTE
Patient on metoprolol and Coumadin for anticoagulation.  Pharmacy to dose Coumadin.  Closely monitor INR.

## 2020-10-24 NOTE — PROGRESS NOTES
[facility] TRIAGE OFFICER ER REPORT  Consult/Admission requested by: Dr Phoenix  Chief complaint: LOC  Pertinent history/ER Course: Mr. Oliveros has a hx of pacemaker that had a LOC and possibly post-ictal state.   Code Status: TBD by admitting team   Patient meets admission criterion: Yes..  Recommendations given or work up & consultations requested per triage officer: none  Consultants involved and pertinent input from consultants: Pacemaker interrogation pending.   Admission status: Observation.   Admission order placed: Yes.   Floor requested: tele  Assigned hospitalist: Sy

## 2020-10-24 NOTE — ED NOTES
Pt stating that he needs to use the restroom (bowel movement), daughter at bedside stating pt uses cane to get around, wheelchair provided. Pt unable to sit up on own, pt with weakness and dizziness. Unable to transfer pt to wheelchair safely. Pt with sudden nausea/vomiting in room after moving.

## 2020-10-24 NOTE — ED NOTES
Med rec updated and complete. Allergies reviewed. Met with pt/family at bedside.  Family confirmed  Medications/strengths. Pt confirmed last doses taken. No antibiotic use in last 14 days.      Home pharmacy [facility].

## 2020-10-24 NOTE — ASSESSMENT & PLAN NOTE
Patient states placed several years ago for symptomatic bradycardia.  With his presenting history of syncope, will get pacemaker interrogated.

## 2020-10-24 NOTE — ED PROVIDER NOTES
"ED Provider Note    CHIEF COMPLAINT  Chief Complaint   Patient presents with   • Syncope     pt bib ems had a syncopal episode while helping friend do some welding, then pt felt dizzy and had syncopal episode. denies head trauma. friend was able to caught pt. has initial gcs of 3 x 2 minutes. arrived gcs=9       HPI  Edwin Oliveros is a 87 y.o. male who presents with an apparent syncopal episode.  The patient is completely confused and disoriented and unable to provide any meaningful history whatsoever, essentially to every question I ask he responds \"I do not know, I guess I am okay.\"  The history was provided by EMS, apparently he was helping a friend weld, apparently he reported feeling dizzy and then passed out, the friend helped lower him to the ground and there is no apparent injury.  The patient does not recall the event, he is oriented to person only, with a review of systems he answers no to all questions but I am not sure how reliable this is.  Apparently on Coumadin with a history of aortic valve replacement.    REVIEW OF SYSTEMS  Negative for fever, rash, chest pain, dyspnea, abdominal pain, nausea, vomiting, diarrhea, headache, focal weakness, focal numbness, focal tingling, back pain. All other systems are negative.     PAST MEDICAL HISTORY  Past Medical History:   Diagnosis Date   • Cancer    • Hypertension    • Valvular heart disease        FAMILY HISTORY  No family history on file.    SOCIAL HISTORY  Social History     Tobacco Use   • Smoking status: Former Smoker   Substance Use Topics   • Alcohol use: Not on file   • Drug use: Not on file       SURGICAL HISTORY  Past Surgical History:   Procedure Laterality Date   • AORTIC VALVE REPLACEMENT     • CARDIAC CATH     • CHOLECYSTECTOMY     • INGUINAL HERNIA REPAIR      Hernia Repair, Inguinal   • KNEE ARTHROSCOPY      Arthroscopy, Knee   • TONSILLECTOMY AND ADENOIDECTOMY         CURRENT MEDICATIONS  I personally reviewed the medication list in the " charting documentation.     ALLERGIES  No Known Allergies    MEDICAL RECORD  I have reviewed patient's medical record and pertinent results are listed above.      PHYSICAL EXAM  VITAL SIGNS: /78   Pulse 60   Resp (!) 22   Ht 1.829 m (6')   Wt 77.1 kg (170 lb)   SpO2 94%   BMI 23.06 kg/m²   Temp 36.4 °C (97.5 °F) (Temporal)   Constitutional: Elderly, confused, upper extremities have soft restraints  HENT: Normocephalic, no evidence of acute trauma.  Eyes: No scleral icterus. Normal conjunctiva   Neck: Supple, comfortable, nonpainful range of motion.   Cardiovascular: Regular heart rate and rhythm.   Thorax & Lungs: Chest is nontender.  Lungs are clear to auscultation with good air movement bilaterally.  No wheeze, rhonchi, nor rales.   Abdomen: Soft, with no tenderness, rebound nor guarding.  No mass or pulsatile mass appreciated.  Skin: Warm, dry. No rash appreciated  Extremities/Musculoskeletal: No sign of trauma. No asymmetric calf tenderness, erythema or edema. Normal range of motion   Neurologic: Alert, disoriented, moves all 4 extremities with apparent equal strength, follows commands, no obvious focal deficits    DIAGNOSTIC STUDIES / PROCEDURES    LABS/EKGs  Results for orders placed or performed during the hospital encounter of 10/24/20   CBC WITH DIFFERENTIAL   Result Value Ref Range    WBC 6.1 4.8 - 10.8 K/uL    RBC 4.22 (L) 4.70 - 6.10 M/uL    Hemoglobin 12.7 (L) 14.0 - 18.0 g/dL    Hematocrit 40.0 (L) 42.0 - 52.0 %    MCV 94.8 81.4 - 97.8 fL    MCH 30.1 27.0 - 33.0 pg    MCHC 31.8 (L) 33.7 - 35.3 g/dL    RDW 47.7 35.9 - 50.0 fL    Platelet Count 211 164 - 446 K/uL    MPV 9.9 9.0 - 12.9 fL    Neutrophils-Polys 66.30 44.00 - 72.00 %    Lymphocytes 19.50 (L) 22.00 - 41.00 %    Monocytes 10.60 0.00 - 13.40 %    Eosinophils 2.30 0.00 - 6.90 %    Basophils 1.00 0.00 - 1.80 %    Immature Granulocytes 0.30 0.00 - 0.90 %    Nucleated RBC 0.00 /100 WBC    Neutrophils (Absolute) 4.02 1.82 - 7.42 K/uL     Lymphs (Absolute) 1.18 1.00 - 4.80 K/uL    Monos (Absolute) 0.64 0.00 - 0.85 K/uL    Eos (Absolute) 0.14 0.00 - 0.51 K/uL    Baso (Absolute) 0.06 0.00 - 0.12 K/uL    Immature Granulocytes (abs) 0.02 0.00 - 0.11 K/uL    NRBC (Absolute) 0.00 K/uL   COMP METABOLIC PANEL   Result Value Ref Range    Sodium 132 (L) 135 - 145 mmol/L    Potassium 4.3 3.6 - 5.5 mmol/L    Chloride 97 96 - 112 mmol/L    Co2 21 20 - 33 mmol/L    Anion Gap 14.0 7.0 - 16.0    Glucose 145 (H) 65 - 99 mg/dL    Bun 18 8 - 22 mg/dL    Creatinine 0.81 0.50 - 1.40 mg/dL    Calcium 9.3 8.5 - 10.5 mg/dL    AST(SGOT) 20 12 - 45 U/L    ALT(SGPT) 16 2 - 50 U/L    Alkaline Phosphatase 56 30 - 99 U/L    Total Bilirubin 0.9 0.1 - 1.5 mg/dL    Albumin 4.3 3.2 - 4.9 g/dL    Total Protein 6.8 6.0 - 8.2 g/dL    Globulin 2.5 1.9 - 3.5 g/dL    A-G Ratio 1.7 g/dL   TROPONIN   Result Value Ref Range    Troponin T 49 (H) 6 - 19 ng/L   URINALYSIS CULTURE, IF INDICATED    Specimen: Urine   Result Value Ref Range    Color Yellow     Character Clear     Specific Gravity 1.016 <1.035    Ph 5.5 5.0 - 8.0    Glucose Negative Negative mg/dL    Ketones Trace (A) Negative mg/dL    Protein Negative Negative mg/dL    Bilirubin Negative Negative    Urobilinogen, Urine 0.2 Negative    Nitrite Negative Negative    Leukocyte Esterase Negative Negative    Occult Blood Negative Negative    Micro Urine Req see below    PROTHROMBIN TIME (INR)   Result Value Ref Range    PT 20.8 (H) 12.0 - 14.6 sec    INR 1.74 (H) 0.87 - 1.13   APTT   Result Value Ref Range    APTT 32.5 24.7 - 36.0 sec   ESTIMATED GFR   Result Value Ref Range    GFR If African American >60 >60 mL/min/1.73 m 2    GFR If Non African American >60 >60 mL/min/1.73 m 2   EKG   Result Value Ref Range    Report       [facility]    Test Date:  2020-10-24  Pt Name:    GABRIELLE SANCHEZ                 Department: ER  MRN:        [id number]                      Room:       [facility]  Gender:     Male                          Technician: [id number]  :        [id number]                   Requested By:ZACH CHERY  Order #:    [id number]                    Reading MD: ZACH CHERY MD    Measurements  Intervals                                Axis  Rate:       90                           P:  ND:         80                           QRS:        85  QRSD:       228                          T:          -84  QT:         420  QTc:        514    Interpretive Statements  12 Lead EKG interpreted by me to show: -- Rate 60 -- Rhythm: Paced. My  impression of this EKG: Paced rhythm  Electronically Signed On 10- 14:34:23 PDT by ZACH CHERY MD          RADIOLOGY  CT-HEAD W/O   Final Result      1.  Generalized atrophy and chronic microvascular ischemic type changes.   2.  No acute intracranial abnormality.      DX-CHEST-LIMITED (1 VIEW)   Final Result      1.  Cardiomegaly, cardiac pacer and valve prosthesis.   2.  Atherosclerosis.   3.  Mild interstitial prominence could represent mild vascular congestion or chronic changes. No pleural effusion.      EC-ECHOCARDIOGRAM COMPLETE W/ CONT    (Results Pending)         COURSE & MEDICAL DECISION MAKING  I have reviewed any medical record information, laboratory studies and radiographic results as noted above.  Differential diagnoses includes: ICH, delirium, urinary tract infection, anemia, coagulopathy, ACS, syncope versus seizure    Encounter Summary: This is a 87 y.o. male with an apparent syncopal episode, no reports of seizure-like activity, was found to have a GCS of 3 by EMS and upon arrival here he is awake and alert but totally disoriented, no obvious focal deficits appreciated, unable to really provide much history and answers no to all review of systems questions.  No obvious focal findings on exam otherwise.  Will obtain a head CT, blood work, chest x-ray and ultimately he will be reevaluated ------- CT of the head is negative for acute abnormalities.  The blood  work reveals a minimal elevation in his troponin, otherwise within normal limits.  Upon reevaluation the patient is now back to baseline.  He is awake and alert, he is actually very sharp, articulate and has an impressive memory.  I am having concerns about this presentation representing seizure as opposed to syncope and his confusion was a postictal period.  He does have a history of a head and neck cancer and I think he requires further work-up in the hospital for additional evaluation and treatment.      DISPOSITION: Admit in guarded condition      FINAL IMPRESSION  1. Syncope, unspecified syncope type    2. Disorientation           This dictation was created using voice recognition software. The accuracy of the dictation is limited to the abilities of the software. I expect there may be some errors of grammar and possibly content. The nursing notes were reviewed and certain aspects of this information were incorporated into this note.    Electronically signed by: Rosendo Phoenix M.D., 10/24/2020 12:11 PM

## 2020-10-25 LAB
ALBUMIN SERPL BCP-MCNC: 3.8 G/DL (ref 3.2–4.9)
ALBUMIN/GLOB SERPL: 1.6 G/DL
ALP SERPL-CCNC: 50 U/L (ref 30–99)
ALT SERPL-CCNC: 11 U/L (ref 2–50)
ANION GAP SERPL CALC-SCNC: 11 MMOL/L (ref 7–16)
AST SERPL-CCNC: 19 U/L (ref 12–45)
BILIRUB SERPL-MCNC: 1 MG/DL (ref 0.1–1.5)
BUN SERPL-MCNC: 12 MG/DL (ref 8–22)
CALCIUM SERPL-MCNC: 9.4 MG/DL (ref 8.5–10.5)
CHLORIDE SERPL-SCNC: 94 MMOL/L (ref 96–112)
CO2 SERPL-SCNC: 24 MMOL/L (ref 20–33)
CREAT SERPL-MCNC: 0.58 MG/DL (ref 0.5–1.4)
ERYTHROCYTE [DISTWIDTH] IN BLOOD BY AUTOMATED COUNT: 45.6 FL (ref 35.9–50)
GLOBULIN SER CALC-MCNC: 2.4 G/DL (ref 1.9–3.5)
GLUCOSE SERPL-MCNC: 94 MG/DL (ref 65–99)
HCT VFR BLD AUTO: 36.5 % (ref 42–52)
HGB BLD-MCNC: 11.9 G/DL (ref 14–18)
INR PPP: 1.86 (ref 0.87–1.13)
LV EJECT FRACT  99904: 65
LV EJECT FRACT MOD 2C 99903: 53.35
LV EJECT FRACT MOD 4C 99902: 82.11
LV EJECT FRACT MOD BP 99901: 70.04
MAGNESIUM SERPL-MCNC: 2 MG/DL (ref 1.5–2.5)
MCH RBC QN AUTO: 30.1 PG (ref 27–33)
MCHC RBC AUTO-ENTMCNC: 32.6 G/DL (ref 33.7–35.3)
MCV RBC AUTO: 92.4 FL (ref 81.4–97.8)
PHOSPHATE SERPL-MCNC: 2.9 MG/DL (ref 2.5–4.5)
PLATELET # BLD AUTO: 191 K/UL (ref 164–446)
PMV BLD AUTO: 10.1 FL (ref 9–12.9)
POTASSIUM SERPL-SCNC: 3.8 MMOL/L (ref 3.6–5.5)
PROT SERPL-MCNC: 6.2 G/DL (ref 6–8.2)
PROTHROMBIN TIME: 22 SEC (ref 12–14.6)
RBC # BLD AUTO: 3.95 M/UL (ref 4.7–6.1)
SODIUM SERPL-SCNC: 129 MMOL/L (ref 135–145)
TROPONIN T SERPL-MCNC: 51 NG/L (ref 6–19)
TSH SERPL DL<=0.005 MIU/L-ACNC: 2.03 UIU/ML (ref 0.38–5.33)
WBC # BLD AUTO: 5.8 K/UL (ref 4.8–10.8)

## 2020-10-25 PROCEDURE — 84100 ASSAY OF PHOSPHORUS: CPT

## 2020-10-25 PROCEDURE — 83735 ASSAY OF MAGNESIUM: CPT

## 2020-10-25 PROCEDURE — A9270 NON-COVERED ITEM OR SERVICE: HCPCS | Performed by: INTERNAL MEDICINE

## 2020-10-25 PROCEDURE — 700105 HCHG RX REV CODE 258: Performed by: STUDENT IN AN ORGANIZED HEALTH CARE EDUCATION/TRAINING PROGRAM

## 2020-10-25 PROCEDURE — 85610 PROTHROMBIN TIME: CPT

## 2020-10-25 PROCEDURE — A9270 NON-COVERED ITEM OR SERVICE: HCPCS | Performed by: STUDENT IN AN ORGANIZED HEALTH CARE EDUCATION/TRAINING PROGRAM

## 2020-10-25 PROCEDURE — 85027 COMPLETE CBC AUTOMATED: CPT

## 2020-10-25 PROCEDURE — 84484 ASSAY OF TROPONIN QUANT: CPT

## 2020-10-25 PROCEDURE — 36415 COLL VENOUS BLD VENIPUNCTURE: CPT

## 2020-10-25 PROCEDURE — 99225 PR SUBSEQUENT OBSERVATION CARE,LEVEL II: CPT | Performed by: INTERNAL MEDICINE

## 2020-10-25 PROCEDURE — 93306 TTE W/DOPPLER COMPLETE: CPT | Mod: 26 | Performed by: INTERNAL MEDICINE

## 2020-10-25 PROCEDURE — 700102 HCHG RX REV CODE 250 W/ 637 OVERRIDE(OP): Performed by: INTERNAL MEDICINE

## 2020-10-25 PROCEDURE — 99215 OFFICE O/P EST HI 40 MIN: CPT | Performed by: INTERNAL MEDICINE

## 2020-10-25 PROCEDURE — G0378 HOSPITAL OBSERVATION PER HR: HCPCS

## 2020-10-25 PROCEDURE — 700102 HCHG RX REV CODE 250 W/ 637 OVERRIDE(OP): Performed by: STUDENT IN AN ORGANIZED HEALTH CARE EDUCATION/TRAINING PROGRAM

## 2020-10-25 PROCEDURE — 80053 COMPREHEN METABOLIC PANEL: CPT

## 2020-10-25 PROCEDURE — 84443 ASSAY THYROID STIM HORMONE: CPT

## 2020-10-25 RX ORDER — WARFARIN SODIUM 2.5 MG/1
2.5 TABLET ORAL DAILY
Status: DISCONTINUED | OUTPATIENT
Start: 2020-10-25 | End: 2020-10-26 | Stop reason: HOSPADM

## 2020-10-25 RX ADMIN — TAMSULOSIN HYDROCHLORIDE 0.4 MG: 0.4 CAPSULE ORAL at 08:34

## 2020-10-25 RX ADMIN — METOPROLOL SUCCINATE 50 MG: 50 TABLET, EXTENDED RELEASE ORAL at 06:08

## 2020-10-25 RX ADMIN — ASPIRIN 81 MG: 81 TABLET, COATED ORAL at 06:08

## 2020-10-25 RX ADMIN — LISINOPRIL 30 MG: 20 TABLET ORAL at 06:08

## 2020-10-25 RX ADMIN — WARFARIN SODIUM 2.5 MG: 2.5 TABLET ORAL at 17:33

## 2020-10-25 RX ADMIN — SODIUM CHLORIDE: 9 INJECTION, SOLUTION INTRAVENOUS at 18:40

## 2020-10-25 ASSESSMENT — LIFESTYLE VARIABLES
ON A TYPICAL DAY WHEN YOU DRINK ALCOHOL HOW MANY DRINKS DO YOU HAVE: 1
TOTAL SCORE: 0
DOES PATIENT WANT TO STOP DRINKING: NO
ALCOHOL_USE: YES
HOW MANY TIMES IN THE PAST YEAR HAVE YOU HAD 5 OR MORE DRINKS IN A DAY: 0
EVER FELT BAD OR GUILTY ABOUT YOUR DRINKING: NO
HAVE YOU EVER FELT YOU SHOULD CUT DOWN ON YOUR DRINKING: NO
EVER HAD A DRINK FIRST THING IN THE MORNING TO STEADY YOUR NERVES TO GET RID OF A HANGOVER: NO
CONSUMPTION TOTAL: NEGATIVE
AVERAGE NUMBER OF DAYS PER WEEK YOU HAVE A DRINK CONTAINING ALCOHOL: 7
HAVE PEOPLE ANNOYED YOU BY CRITICIZING YOUR DRINKING: NO

## 2020-10-25 ASSESSMENT — ENCOUNTER SYMPTOMS
ORTHOPNEA: 0
DIZZINESS: 0
PALPITATIONS: 0
FEVER: 0
FALLS: 0
CHILLS: 0
HEADACHES: 0
SINUS PAIN: 0
BLURRED VISION: 0
PND: 0
SHORTNESS OF BREATH: 0
LOSS OF CONSCIOUSNESS: 1
NAUSEA: 0
COUGH: 0
DOUBLE VISION: 0
ABDOMINAL PAIN: 0
DEPRESSION: 0

## 2020-10-25 ASSESSMENT — COGNITIVE AND FUNCTIONAL STATUS - GENERAL
CLIMB 3 TO 5 STEPS WITH RAILING: A LITTLE
STANDING UP FROM CHAIR USING ARMS: A LITTLE
DAILY ACTIVITIY SCORE: 22
DRESSING REGULAR LOWER BODY CLOTHING: A LITTLE
MOBILITY SCORE: 21
HELP NEEDED FOR BATHING: A LITTLE
SUGGESTED CMS G CODE MODIFIER MOBILITY: CJ
SUGGESTED CMS G CODE MODIFIER DAILY ACTIVITY: CJ
WALKING IN HOSPITAL ROOM: A LITTLE

## 2020-10-25 ASSESSMENT — FIBROSIS 4 INDEX
FIB4 SCORE: 2.06
FIB4 SCORE: 2.61

## 2020-10-25 NOTE — PROGRESS NOTES
Patient does not currently have device card. He believes it is [employer]. Patient daughter will bring device care.

## 2020-10-25 NOTE — CONSULTS
"Cardiology Initial Consultation    Date of Service  10/25/2020    Referring Physician  Waed Morel D.O.    Reason for Consultation  Elevated troponin    History of Presenting Illness  Edwin Oliveros is a 87 y.o. male with known coronary disease who presented 10/24/2020 with syncope.  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  He denies any prior history of syncope.    He underwent a surgery for cancer in 2005 and points to his right face following which he has had frequent falls.  He falls at least 6-7 times a year.  He has undergone \"lots of tests\" for this with multiple different physicians without any clear results.  He believes he falls only when he turns his head rapidly.    He denies any chest discomfort or dyspnea.  No heart failure symptoms.  He reports having an issue with his right carotid.  He is followed closely by Dr. Ramos.    Review of Systems  Review of Systems   Constitutional: Negative for malaise/fatigue.   Respiratory: Negative for shortness of breath.    Cardiovascular: Negative for chest pain, palpitations, orthopnea, leg swelling and PND.   Gastrointestinal: Negative for abdominal pain.   Musculoskeletal: Negative for falls.   Neurological: Positive for loss of consciousness. Negative for dizziness.   Psychiatric/Behavioral: Negative for depression.   All other systems reviewed and are negative.      Past Medical History  Hypertension  Coronary artery disease status post CABG in 2012 at [place]  Status post aortic valve replacement in 2012 at [place]  Paroxysmal atrial fibrillation  Status post permanent pacemaker    Surgical History   has a past surgical history that includes tonsillectomy and adenoidectomy; cholecystectomy; inguinal hernia repair; knee arthroscopy; aortic valve replacement; and zzz " cardiac cath.    Family History  Reviewed, not pertinent.    Social History  Patient does not smoke.  Denies any alcohol or recreational drug use.    Home Medications   Prior to Admission Medications   Prescriptions Last Dose Informant Patient Reported? Taking?   hydrochlorothiazide (MICROZIDE) 12.5 MG capsule Not Taking at Unknown time Patient No No   Sig: Take 1 Cap by mouth every day.   Patient not taking: Reported on 10/24/2020   lisinopril (PRINIVIL) 10 MG TABS Not Taking at Unknown time Patient No No   Sig: Take 1 Tab by mouth every day.   Patient not taking: Reported on 10/24/2020   lisinopril (PRINIVIL) 30 MG tablet 10/24/2020 at 0700 Patient Yes No   Sig: Take 30 mg by mouth every day.   metoprolol SR (TOPROL XL) 50 MG TB24 10/24/2020 at 0700 Patient No No   Sig: Take 1 Tab by mouth every day.   tamsulosin (FLOMAX) 0.4 MG capsule 10/24/2020 at 0700 Patient Yes No   Sig: Take 0.4 mg by mouth every day.   warfarin (JANTOVEN) 2.5 MG Tab 10/23/2020 at 1900 Patient Yes No   Sig: Take 2.5 mg by mouth every evening.      Facility-Administered Medications: None       Inpatient Medications  • aspirin EC  81 mg DAILY   • metoprolol SR  50 mg DAILY   • tamsulosin  0.4 mg AFTER BREAKFAST   • senna-docusate  2 Tab BID    And   • polyethylene glycol/lytes  1 Packet QDAY PRN    And   • magnesium hydroxide  30 mL QDAY PRN    And   • bisacodyl  10 mg QDAY PRN   • acetaminophen  650 mg Q6HRS PRN   • enalaprilat  1.25 mg Q6HRS PRN   • NS   Continuous   • lisinopril  30 mg DAILY   • MD Alert...Warfarin per Pharmacy   PHARMACY TO DOSE       Allergies  No Known Allergies    Vital signs in last 24 hours  Temp:  [36.4 °C (97.5 °F)-36.9 °C (98.5 °F)] 36.8 °C (98.3 °F)  Pulse:  [31-99] 68  Resp:  [16-18] 16  BP: (100-165)/() 152/98  SpO2:  [94 %-97 %] 97 %    Physical Exam  Physical Exam   Constitutional: He is oriented to person, place, and time and well-developed, well-nourished, and in no distress. No distress.   HENT:    Head: Normocephalic and atraumatic.   Eyes: Conjunctivae are normal. No scleral icterus.   Neck: Normal range of motion. Neck supple. No JVD present.   Cardiovascular: Normal rate, regular rhythm and intact distal pulses. Exam reveals no gallop and no friction rub.   No murmur heard.  Pulmonary/Chest: Effort normal and breath sounds normal. No respiratory distress. He has no wheezes. He has no rales. He exhibits no tenderness.   Abdominal: Soft. Bowel sounds are normal. He exhibits no distension. There is no abdominal tenderness.   Musculoskeletal: Normal range of motion.         General: No edema.   Neurological: He is alert and oriented to person, place, and time.   Skin: Skin is warm and dry. No rash noted. He is not diaphoretic.   Psychiatric: Mood, affect and judgment normal.   Nursing note and vitals reviewed.      Lab Review  Recent Labs     10/24/20  1150 10/25/20  0326   WBC 6.1 5.8   RBC 4.22* 3.95*   HEMOGLOBIN 12.7* 11.9*   HEMATOCRIT 40.0* 36.5*   PLATELETCT 211 191   MCV 94.8 92.4   MPV 9.9 10.1     Recent Labs     10/24/20  1150 10/25/20  0326   SODIUM 132* 129*   POTASSIUM 4.3 3.8   CHLORIDE 97 94*   CO2 21 24   GLUCOSE 145* 94   BUN 18 12   CREATININE 0.81 0.58      Lab Results   Component Value Date/Time    TROPONINT 49 (H) 10/24/2020 11:50 AM       Lab Results   Component Value Date/Time    ASTSGOT 19 10/25/2020 03:26 AM    ALTSGPT 11 10/25/2020 03:26 AM     No results found for: CHOLSTRLTOT, LDL, HDL, TRIGLYCERIDE      Labs reviewed as noted above.  Mild anemia.  Normal creatinine.  Troponin 49.  No repeat done.    Cardiac Imaging and Procedures Review  EKG performed yesterday at 1342 hrs. was personally reviewed and per my interpretation shows ventricular paced rhythm.    Echocardiogram performed yesterday was personally reviewed and per my interpretation shows preserved LV systolic function.  Mild anterior leaflet mitral valve prolapse with posteriorly directed eccentric jet.  Bioprosthetic  aortic valve with a mean gradient of 10 mmHg.  No AI noted.    His pacemaker was interrogated yesterday showing that he was in persistent atrial fibrillation since May 2020    Assessment/Plan    Syncope:  Paroxysmal atrial fibrillation:  Status post bioprosthetic aortic valve replacement:  Coronary artery disease:  Hypertension:  Elevated troponin:   Frequent falls:    Patient denies any prior history of syncope.  His symptoms could have been orthostatic.  No significant arrhythmias noted on his pacemaker.  His echocardiogram shows preserved LV function and no valvular pathology.  His bioprosthetic aortic valve is functioning normally.  He reports a history of carotid disease and a carotid duplex is pending at this time.  He was hypertensive on presentation which has slowly improved.    He has been in persistent atrial fibrillation since May 2020.  Continue metoprolol and Coumadin at current dose.    His troponins are only minimally elevated.  He does not have any concerning anginal symptoms.  His EKG is not interpretable due to a paced rhythm.  Patient has had frequent falls as noted above.  He is already on Coumadin for his atrial fibrillation.  I do not think he will be a good candidate for triple therapy or even addition of single antiplatelet therapy.  Would therefore recommend that he follow-up with Dr. Ramos in outpatient cardiology clinic for reevaluation.  If however he starts having any anginal symptoms, may benefit with stress testing prior to discharge.      We will sign off.  Thank you for allowing me to participate in the care of this patient. Please do not hesitate to contact me with any questions.    Cindi Lyman MD, [facility]  Cardiologist  [facility]

## 2020-10-25 NOTE — PROGRESS NOTES
[facility] Medicine Daily Progress Note    Date of Service  10/25/2020    Chief Complaint  87 y.o. male admitted 10/24/2020 with syncope.     Hospital Course    This is an 87-year-old male with medical history significant for aortic valve replacement, atrial fibrillation on Coumadin, pacemaker neck cancer with surgical resection.  Patient reports syncope with loss of consciousness after turning his head to the left.  Patient does have extensive history of surgery to his neck.  Will consult cardiology to rule out cardiac cause.  Carotid ultrasound pending to rule out subclavian steal syndrome.      Interval Problem Update  Patient is seen and examined at bedside.  No acute events overnight.  Patient is resting comfortably in bed and in no acute distress.  Patient denies chest pain, shortness of breath, palpitations.  Patient reports that his syncopal event was possibly related to his head positioning.    Consultants/Specialty  Cardiology    Code Status  DNAR/DNI    Disposition  Await cardiology eval, carotid ultrasound. Possible discharged in next 1-2 days.     Review of Systems  Review of Systems   Constitutional: Negative for chills and fever.   HENT: Negative for congestion and sinus pain.    Eyes: Negative for blurred vision and double vision.   Respiratory: Negative for cough and shortness of breath.    Cardiovascular: Negative for chest pain and palpitations.   Gastrointestinal: Negative for abdominal pain and nausea.   Skin: Negative for rash.   Neurological: Negative for dizziness and headaches.   Psychiatric/Behavioral: Negative for depression.        Physical Exam  Temp:  [36.4 °C (97.5 °F)-36.9 °C (98.5 °F)] 36.8 °C (98.3 °F)  Pulse:  [31-99] 68  Resp:  [16-18] 16  BP: (100-165)/() 152/98  SpO2:  [94 %-97 %] 97 %    Physical Exam  Constitutional:       General: He is not in acute distress.     Appearance: He is normal weight. He is not toxic-appearing.   HENT:      Head: Normocephalic and atraumatic.       Nose: Nose normal.      Mouth/Throat:      Mouth: Mucous membranes are moist.      Pharynx: Oropharynx is clear.   Eyes:      Pupils: Pupils are equal, round, and reactive to light.   Cardiovascular:      Rate and Rhythm: Normal rate. Rhythm irregular.      Heart sounds: No murmur. No gallop.    Pulmonary:      Effort: No respiratory distress.      Breath sounds: No wheezing.   Abdominal:      General: Bowel sounds are normal.      Tenderness: There is no abdominal tenderness. There is no guarding.   Skin:     General: Skin is warm and dry.   Neurological:      Mental Status: He is alert.         Fluids    Intake/Output Summary (Last 24 hours) at 10/25/2020 1357  Last data filed at 10/25/2020 0459  Gross per 24 hour   Intake 240 ml   Output 500 ml   Net -260 ml       Laboratory  Recent Labs     10/24/20  1150 10/25/20  0326   WBC 6.1 5.8   RBC 4.22* 3.95*   HEMOGLOBIN 12.7* 11.9*   HEMATOCRIT 40.0* 36.5*   MCV 94.8 92.4   MCH 30.1 30.1   MCHC 31.8* 32.6*   RDW 47.7 45.6   PLATELETCT 211 191   MPV 9.9 10.1     Recent Labs     10/24/20  1150 10/25/20  0326   SODIUM 132* 129*   POTASSIUM 4.3 3.8   CHLORIDE 97 94*   CO2 21 24   GLUCOSE 145* 94   BUN 18 12   CREATININE 0.81 0.58   CALCIUM 9.3 9.4     Recent Labs     10/24/20  1150 10/25/20  0326   APTT 32.5  --    INR 1.74* 1.86*               Imaging  EC-ECHOCARDIOGRAM COMPLETE W/O CONT   Final Result      CT-HEAD W/O   Final Result      1.  Generalized atrophy and chronic microvascular ischemic type changes.   2.  No acute intracranial abnormality.      DX-CHEST-LIMITED (1 VIEW)   Final Result      1.  Cardiomegaly, cardiac pacer and valve prosthesis.   2.  Atherosclerosis.   3.  Mild interstitial prominence could represent mild vascular congestion or chronic changes. No pleural effusion.      US-CAROTID DOPPLER BILAT    (Results Pending)        Assessment/Plan  Syncope  Assessment & Plan  Patient presented with syncope episode with loss of consciousness   History  of aortic valve replacement, pacemaker, paroxysmal A. fib.   History of neck cancer with surgical resection  Patient has been on flowmax for over 10 years, do not suspect this to be precipitating factor    CT head with no acute pathology.    Carotid ultrasound pending to rule out subclavian steal syndrome  2D echo pending  Cardiology recommendations appreciated.        History of permanent cardiac pacemaker placement  Assessment & Plan  Patient states placed several years ago for symptomatic bradycardia.  With his presenting history of syncope, will get pacemaker interrogated.    Paroxysmal atrial fibrillation (HCC)  Assessment & Plan  Patient on metoprolol and Coumadin for anticoagulation.  Pharmacy to dose Coumadin.  Closely monitor INR.    S/p AVR replacement- (present on admission)  Assessment & Plan  History of aortic valve replacement few years ago.  Aspirin  Patient follows up with his cardiologist over at [facility] [place].  We will get a 2D echo.    HTN (hypertension)- (present on admission)  Assessment & Plan  Continue home medication- lisinopril and metoprolol.  Rule out orthostatic hypotension.  Will closely monitor blood pressure.       VTE prophylaxis: coumadin

## 2020-10-25 NOTE — CARE PLAN
Problem: Communication  Goal: The ability to communicate needs accurately and effectively will improve  Discussed POC, answered all current questions.   Outcome: PROGRESSING AS EXPECTED     Problem: Safety  Goal: Will remain free from injury  Outcome: PROGRESSING AS EXPECTED  Goal: Will remain free from falls  Fall precautions in place. Patient educated.   Outcome: PROGRESSING AS EXPECTED     Problem: Knowledge Deficit  Goal: Knowledge of disease process/condition, treatment plan, diagnostic tests, and medications will improve  Outcome: PROGRESSING AS EXPECTED

## 2020-10-25 NOTE — PROGRESS NOTES
Assumed care of patient, bedside report received from CARMELO Miranda. Updated on POC, call light within reach and fall precautions in place. Bed locked and in lowest position. Patient instructed to call for assistance before getting out of bed. All questions answered, no other needs at this time.

## 2020-10-25 NOTE — PROGRESS NOTES
Patient transported to floor on zoll. Patient A&Ox 4. On room air. Paced on tele. No n/v.  No complaints of pain at this time. Vitals stable. POC discussed with patient, patient verbalized understanding. Call light and personal belongings in reach. Bed locked and in lowest position. Alarm and fall precautions in place.

## 2020-10-25 NOTE — PROGRESS NOTES
Inpatient Anticoagulation Service Note  Date: 10/25/2020  Reason for Anticoagulation: Atrial Fibrillation, Bioprosthetic Valve Replacement (s/p AVR)   [id number] VASc Score: 3  HAS-BLED Score: 3   Hemoglobin Value: (!) 11.9  Hematocrit Value: (!) 36.5  Lab Platelet Value: 191  Target INR: 2.0 to 3.0  INR from last 7 days     Date/Time INR Value    10/25/20 0326  (!) 1.86    10/24/20 1150  (!) 1.74        Dose from last 7 days     Date/Time Dose (mg)    10/25/20 1409  2.5    10/24/20 2303  5        Average Dose (mg): TBD (Home Dose: 2.5 mg daily per MR report)  Significant Interactions: Aspirin  Bridge Therapy: No   Reversal Agent Administered: Not Applicable  Comments: INR below goal on admit. No overt bleeding noted per chart review. H/H low. PLT down. Warfarin interactions noted. Will give warfarin 2.5 mg today. INR with AM labs.    Plan:  Warfarin 2.5 mg 10/25/20  Education Material Provided?: No (chronic warfarin patient)  Pharmacist suggested discharge dosing: warfarin 2.5 mg daily    Dario Davidson, PharmD

## 2020-10-25 NOTE — PROGRESS NOTES
2 RN skin check complete.   Devices in place glasses  Skin assessed under devices yes.  Confirmed pressure ulcers found on none.  New potential pressure ulcers noted on right second toe.   Wound consult placed yes.  The following interventions in place patient turns self side to side.     Sacrum red and blanching.   Small black dot on right second toe.

## 2020-10-25 NOTE — PROGRESS NOTES
Inpatient Anticoagulation Service Note    Date: 10/24/2020    Reason for Anticoagulation: Atrial Fibrillation   Target INR: 2.0 to 3.0  [id number] VASc Score: 3  HAS-BLED Score: 3   Hemoglobin Value: (!) 12.7  Hematocrit Value: (!) 40  Lab Platelet Value: 211    INR from last 7 days     Date/Time INR Value    10/24/20 1150  (!) 1.74        Dose from last 7 days     Date/Time Dose (mg)    10/24/20 2303  5        Average Dose (mg): 2.5  Significant Interactions: Aspirin  Bridge Therapy: No  Reversal Agent Administered: Not Applicable    Comments: On home warfarin for AFib. INR today was subtherapeutic. H/H appear stable. No sxs of bleeding noted. Cardiac diet ordered. No new DDIs.     Plan:  Will give warfarin 5 mg PO x 1 dose this evening and obtain an INR with AM labs tomorrow. Pharmacy will continue to follow.  Education Material Provided?: Yes  Pharmacist suggested discharge dosing: TBD based on subsequent INR results. May be able to resume home dose of 2.5 mg PO daily. Obtain a follow up INR within one week of discharge.     Ginger sTai, PharmD, BCPS

## 2020-10-26 ENCOUNTER — APPOINTMENT (OUTPATIENT)
Dept: RADIOLOGY | Facility: MEDICAL CENTER | Age: 85
End: 2020-10-26
Attending: INTERNAL MEDICINE
Payer: MEDICARE

## 2020-10-26 ENCOUNTER — PHARMACY VISIT (OUTPATIENT)
Dept: PHARMACY | Facility: MEDICAL CENTER | Age: 85
End: 2020-10-26
Payer: MEDICARE

## 2020-10-26 VITALS
TEMPERATURE: 97.3 F | HEART RATE: 61 BPM | DIASTOLIC BLOOD PRESSURE: 92 MMHG | RESPIRATION RATE: 18 BRPM | SYSTOLIC BLOOD PRESSURE: 150 MMHG | BODY MASS INDEX: 22.69 KG/M2 | OXYGEN SATURATION: 98 % | WEIGHT: 167.55 LBS | HEIGHT: 72 IN

## 2020-10-26 LAB
INR PPP: 2.39 (ref 0.87–1.13)
PROTHROMBIN TIME: 26.8 SEC (ref 12–14.6)

## 2020-10-26 PROCEDURE — 97165 OT EVAL LOW COMPLEX 30 MIN: CPT

## 2020-10-26 PROCEDURE — 95819 EEG AWAKE AND ASLEEP: CPT | Mod: 26 | Performed by: STUDENT IN AN ORGANIZED HEALTH CARE EDUCATION/TRAINING PROGRAM

## 2020-10-26 PROCEDURE — 93880 EXTRACRANIAL BILAT STUDY: CPT

## 2020-10-26 PROCEDURE — 97161 PT EVAL LOW COMPLEX 20 MIN: CPT

## 2020-10-26 PROCEDURE — 700102 HCHG RX REV CODE 250 W/ 637 OVERRIDE(OP): Performed by: STUDENT IN AN ORGANIZED HEALTH CARE EDUCATION/TRAINING PROGRAM

## 2020-10-26 PROCEDURE — G0378 HOSPITAL OBSERVATION PER HR: HCPCS

## 2020-10-26 PROCEDURE — RXMED WILLOW AMBULATORY MEDICATION CHARGE: Performed by: INTERNAL MEDICINE

## 2020-10-26 PROCEDURE — 99217 PR OBSERVATION CARE DISCHARGE: CPT | Performed by: INTERNAL MEDICINE

## 2020-10-26 PROCEDURE — 36415 COLL VENOUS BLD VENIPUNCTURE: CPT

## 2020-10-26 PROCEDURE — 95819 EEG AWAKE AND ASLEEP: CPT | Performed by: STUDENT IN AN ORGANIZED HEALTH CARE EDUCATION/TRAINING PROGRAM

## 2020-10-26 PROCEDURE — A9270 NON-COVERED ITEM OR SERVICE: HCPCS | Performed by: STUDENT IN AN ORGANIZED HEALTH CARE EDUCATION/TRAINING PROGRAM

## 2020-10-26 PROCEDURE — 85610 PROTHROMBIN TIME: CPT

## 2020-10-26 RX ADMIN — DOCUSATE SODIUM 50 MG AND SENNOSIDES 8.6 MG 2 TABLET: 8.6; 5 TABLET, FILM COATED ORAL at 06:29

## 2020-10-26 RX ADMIN — METOPROLOL SUCCINATE 50 MG: 50 TABLET, EXTENDED RELEASE ORAL at 06:27

## 2020-10-26 RX ADMIN — ASPIRIN 81 MG: 81 TABLET, COATED ORAL at 06:30

## 2020-10-26 RX ADMIN — LISINOPRIL 30 MG: 20 TABLET ORAL at 06:30

## 2020-10-26 RX ADMIN — TAMSULOSIN HYDROCHLORIDE 0.4 MG: 0.4 CAPSULE ORAL at 08:09

## 2020-10-26 ASSESSMENT — COGNITIVE AND FUNCTIONAL STATUS - GENERAL
CLIMB 3 TO 5 STEPS WITH RAILING: A LITTLE
MOVING TO AND FROM BED TO CHAIR: A LITTLE
DRESSING REGULAR LOWER BODY CLOTHING: A LITTLE
DAILY ACTIVITIY SCORE: 22
SUGGESTED CMS G CODE MODIFIER DAILY ACTIVITY: CJ
WALKING IN HOSPITAL ROOM: A LITTLE
HELP NEEDED FOR BATHING: A LITTLE
MOBILITY SCORE: 20
MOVING FROM LYING ON BACK TO SITTING ON SIDE OF FLAT BED: A LITTLE
SUGGESTED CMS G CODE MODIFIER MOBILITY: CJ

## 2020-10-26 ASSESSMENT — ACTIVITIES OF DAILY LIVING (ADL): TOILETING: INDEPENDENT

## 2020-10-26 ASSESSMENT — GAIT ASSESSMENTS
DISTANCE (FEET): 120
GAIT LEVEL OF ASSIST: SUPERVISED
DEVIATION: ANTALGIC;STEP TO
ASSISTIVE DEVICE: SINGLE POINT CANE

## 2020-10-26 NOTE — DISCHARGE SUMMARY
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 denies head trauma. friend was able to caught pt. has initial gcs of 3 x 2 minutes. arrived gcs=9       Reason for Admission  EMS     Admission Date  10/24/2020    CODE STATUS  DNAR/DNI    HPI & HOSPITAL COURSE  This is a 87 y.o. male here with witnessed syncopal event.  Patient has medical history significant for aortic valve replacement, atrial fibrillation, neck cancer status post resection, prostate cancer.  On admission, CT head was unremarkable. Cardiology was consulted. No significant arrhythmias noted on his pacemaker.  His echocardiogram shows preserved LV function and no valvular pathology.  His bioprosthetic aortic valve is functioning normally.  Troponin was mildly elevated on admission with no concerning anginal symptoms.  Bilateral carotid Dopplers demonstrated moderate stenosis of the distal right internal carotid (50-69%), out stenosis of the left internal carotid (<50%).  Patient was advised to stop taking Flomax as this may have contributed to syncopal event.  Patient will follow up with PCP in 1 week to discuss necessity of Flomax and other medication reconciliation.       Therefore, he is discharged in fair and stable condition to home with close outpatient follow-up.    The patient met 2-midnight criteria for an inpatient stay at the time of discharge.    Discharge Date  10/26/2020    FOLLOW UP ITEMS POST DISCHARGE  Please follow-up with PCP and-5 days for medication reconciliation.  Follow-up with cardiology in 1 week    DISCHARGE DIAGNOSES  Active Problems:    Syncope POA: Unknown    HTN (hypertension) POA: Yes    S/p AVR replacement POA: Yes      Overview: IMO 10/1 Regulatory Update    Paroxysmal atrial fibrillation (HCC) POA: Unknown    History of permanent cardiac  pacemaker placement POA: Unknown  Resolved Problems:    * No resolved hospital problems. *      FOLLOW UP  No future appointments.  Carlito Ramos D.O.  [address]  [address]  [place] [id number] [phone]    In 1 week      Gumaro Suazo M.D.  [address]  [place] [address]  [phone]            MEDICATIONS ON DISCHARGE     Medication List      CHANGE how you take these medications      Instructions   lisinopril 30 MG tablet  What changed: Another medication with the same name was removed. Continue taking this medication, and follow the directions you see here.  Commonly known as: PRINIVIL   Take 30 mg by mouth every day.  Dose: 30 mg        CONTINUE taking these medications      Instructions   Adult Aspirin Regimen 81 MG EC tablet  Generic drug: aspirin   Take 1 Tab by mouth every day.  Dose: 81 mg     Jantoven 2.5 MG Tabs  Generic drug: warfarin   Take 2.5 mg by mouth every evening.  Dose: 2.5 mg     metoprolol SR 50 MG Tb24  Commonly known as: TOPROL XL   Take 1 Tab by mouth every day.  Dose: 50 mg        STOP taking these medications    hydrochlorothiazide 12.5 MG capsule  Commonly known as: MICROZIDE     tamsulosin 0.4 MG capsule  Commonly known as: FLOMAX            Allergies  No Known Allergies    DIET  Orders Placed This Encounter   Procedures   • Diet Order Cardiac     Standing Status:   Standing     Number of Occurrences:   1     Order Specific Question:   Diet:     Answer:   Cardiac [6]     Order Specific Question:   Texture Modifier     Answer:   Level 6 - Soft & Bite Sized (Dysphagia 3)     Order Specific Question:   Liquid level     Answer:   Level 0 - Thin   • Discontinue Diet Tray     Standing Status:   Standing     Number of Occurrences:   1       ACTIVITY  As tolerated.  Weight bearing as tolerated    CONSULTATIONS  Cardiology    PROCEDURES  none    LABORATORY  Lab Results   Component Value Date    SODIUM 129 (L) 10/25/2020    POTASSIUM 3.8 10/25/2020    CHLORIDE 94 (L)  10/25/2020    CO2 24 10/25/2020    GLUCOSE 94 10/25/2020    BUN 12 10/25/2020    CREATININE 0.58 10/25/2020        Lab Results   Component Value Date    WBC 5.8 10/25/2020    HEMOGLOBIN 11.9 (L) 10/25/2020    HEMATOCRIT 36.5 (L) 10/25/2020    PLATELETCT 191 10/25/2020        Total time of the discharge process exceeds 43 minutes.

## 2020-10-26 NOTE — PROCEDURES
VIDEO ELECTROENCEPHALOGRAM REPORT      Referring provider: Dequan Dan D.O    DOS: 10/26/20 (0 hours and 24 minutes of total recording time).     INDICATION:  Edwin Oliveros 87 y.o. male presenting with altered mental status    CURRENT ANTIEPILEPTIC AND/OR SEDATING REGIMEN: None    TECHNIQUE: 30 channel video electroencephalogram (EEG) was performed in accordance with the international 10-20 system. The study was reviewed in bipolar and referential montages. The recording examined the patient in the awake and drowsy/sleep state(s).     DESCRIPTION OF THE RECORD:  During wakefulness, the background was  continuous and showed a 10 Hz posterior dominant rhythm .  There was reactivity to eye closure/opening.  A normal anterior-posterior gradient was noted with faster beta frequencies seen anteriorly.  During drowsiness, theta/delta frequencies were seen.    Sleep was captured and was characterized by diffuse background delta/theta activity with a loss of myogenic artifact.  N2 sleep transients in the form of sleep spindles and vertex waves were seen in the leads over the central regions.     ACTIVATION PROCEDURES:   Intermittent Photic stimulation was performed in a stepwise fashion from 1 to 30 Hz, and did not elicit any abnormal responses.      ICTAL AND INTERICTAL FINDINGS:   No focal or generalized epileptiform activity noted.     No regional slowing was seen during this routine study.      No clinical events or seizures were reported or recorded during the study.     EKG: sampling of the EKG recording did not demonstrate any abnormalities    EVENTS:  None    INTERPRETATION:  Normal video EEG recording in the awake and drowsy/sleep state(s):  - No persistent focal asymmetries.  - No epileptiform discharges.   - No seizures. Clinical correlation is recommended.      Note: A normal EEG does not rule out epilepsy.  If the clinical suspicion remains high for seizures, a prolonged recording to capture clinical or  subclinical events may be helpful.        Leonides Lynch MD  Epilepsy and General Neurology  Department of Neurology  Clinical  of Neurology [facility].   Office: [phone]  Fax: [phone]

## 2020-10-26 NOTE — WOUND TEAM
Patient has a tiny blood blister and callus to distal right 2nd toe. There are no signs of infection. The callus is flat and does not need debridement. This wound does not need wound care, left open to air.

## 2020-10-26 NOTE — CARE PLAN
Problem: Communication  Goal: The ability to communicate needs accurately and effectively will improve  Outcome: PROGRESSING AS EXPECTED     Problem: Safety  Goal: Will remain free from injury  Outcome: PROGRESSING AS EXPECTED  Goal: Will remain free from falls  Outcome: PROGRESSING AS EXPECTED     Problem: Knowledge Deficit  Goal: Knowledge of disease process/condition, treatment plan, diagnostic tests, and medications will improve  Outcome: PROGRESSING AS EXPECTED     Problem: Fluid Volume:  Goal: Will maintain balanced intake and output  Outcome: PROGRESSING AS EXPECTED     Problem: Respiratory:  Goal: Respiratory status will improve  Outcome: PROGRESSING AS EXPECTED     Problem: Mobility  Goal: Risk for activity intolerance will decrease  Outcome: PROGRESSING AS EXPECTED     Problem: Skin Integrity  Goal: Risk for impaired skin integrity will decrease  Outcome: PROGRESSING AS EXPECTED

## 2020-10-26 NOTE — DISCHARGE PLANNING
Anticipated Discharge Disposition: Home     Action: Pt discussed during IDT rounds. Per Dr. Morel pt to d/c with no needs.   Pt currently observation status and showing [employer] on facesheet. Pt does not require an IMM.     Barriers to Discharge: None     Plan: LSW to assist as needed

## 2020-10-26 NOTE — PROGRESS NOTES
Inpatient Anticoagulation Service Note  Date: 10/26/2020  Reason for Anticoagulation: Atrial Fibrillation, Bioprosthetic Valve Replacement (s/p AVR)   [id number] VASc Score: 3  HAS-BLED Score: 3   Hemoglobin Value: (!) 11.9  Hematocrit Value: (!) 36.5  Lab Platelet Value: 191  Target INR: 2.0 to 3.0  INR from last 7 days     Date/Time INR Value    10/26/20 0300  (!) 2.39    10/25/20 0326  (!) 1.86    10/24/20 1150  (!) 1.74        Dose from last 7 days     Date/Time Dose (mg)    10/26/20 1025  2.5    10/25/20 1409  2.5    10/24/20 2303  5        Average Dose (mg): TBD (Home Dose: 2.5 mg daily per MR report)  Significant Interactions: Aspirin  Bridge Therapy: No   Reversal Agent Administered: Not Applicable  Comments: INR at goal. No overt bleeding noted per chart review. No new H/H or PLT yet today. Warfarin interactions noted. Will give warfarin 2.5 mg today. INR with AM labs.    Plan:  Warfarin 2.5 mg 10/26/20  Education Material Provided?: No (chronic warfarin patient)  Pharmacist suggested discharge dosing: warfarin 2.5 mg daily    Dario Davidson, PharmD

## 2020-10-26 NOTE — PROGRESS NOTES
Patient's BP throughout night was 142/99, 144/88, then 163/94.  BP at 0800 was 89/59.  Dr. Morel updated on trend and changes.  Dr. Morel at bedside.  Patient not symptomatic.

## 2020-10-26 NOTE — THERAPY
Physical Therapy   Initial Evaluation     Patient Name: Edwin Oliveros  Age:  87 y.o., Sex:  male  Medical Record #: [id number]  Today's Date: 10/26/2020     Precautions: Fall Risk    Assessment  Patient is an 87 y.o. male admitted following syncope. Pt with hx including pacemaker, a fib, and AVR replacement. Pt seen for PT evaluation at this time. Pt appears at his functional baseline and was able to ambulate with SPC and wall rail, which he states he has at home. Pt reports no concerns with DC and appears functionally capable at this time. Patient will not be actively followed for physical therapy services at this time, however may be seen if requested by physician for 1 more visit within 30 days to address any discharge or equipment needs.     Plan  Recommend Physical Therapy for Evaluation only  DC Equipment Recommendations: None  Discharge Recommendations: Recommend home health for continued physical therapy services       10/26/20 1208   Prior Living Situation   Prior Services None   Housing / Facility 1 Story House   Steps Into Home 2 (back porch)   Steps In Home 0   Bathroom Set up Walk In Shower;Grab Bars   Equipment Owned Single Point Cane;Wheelchair;Grab Bar(s) In Tub / Shower;Grab Bar(s) By Toilet   Lives with - Patient's Self Care Capacity Spouse   Comments pt reports he has grab bars and handrails all throughout his house. appears that pt ambulates with SPC in one hand, and railing on the wall in other. pt uses WC in his yard and ATV to get around. uses SPC in the home.    Prior Level of Functional Mobility   Bed Mobility Independent   Transfer Status Independent   Ambulation Independent   Distance Ambulation (Feet) household-limited community. WC outside.   Assistive Devices Used Single Point Cane   Wheelchair Independent   Stairs Independent   Balance Assessment   Sitting Balance (Static) Good   Sitting Balance (Dynamic) Good   Standing Balance (Static) Fair +   Standing Balance (Dynamic) Fair   Weight  Shift Sitting Fair   Weight Shift Standing Fair   Comments standing with SPC and handrail   Gait Analysis   Gait Level Of Assist Supervised   Assistive Device Single Point Cane   Distance (Feet) 120   Deviation Antalgic;Step To (slight R lean, fwd flexion)   Weight Bearing Status no restrictions   Comments no LOB. appears baseline. uses SPC in one hand and reaches for rails/objects in the other.    Bed Mobility    Supine to Sit Supervised   Scooting Supervised   Rolling Supervised   Functional Mobility   Sit to Stand Supervised   Bed, Chair, Wheelchair Transfer Supervised

## 2020-10-26 NOTE — DISCHARGE PLANNING
1430: Meds-to-Beds: Discharge prescription order listed below delivered to patient's bedside. RN notified. Patient counseled. Reviewed signs/symptoms of bleeding and when to seek medical attention.      Edwin Oliveros   Home Medication Instructions MIGUELINA:[id number]    Printed on:10/26/20 1501   Medication Information                      aspirin EC (ECOTRIN) 81 MG Tablet Delayed Response  Take 1 Tab by mouth every day.               Elizabeth Dash, PharmD

## 2020-10-26 NOTE — PROGRESS NOTES
Patient discharged.  Heart monitor and IV removed.  Paperwork reviewed and signed.  Med drawer emptied.  Room stripped.  Chart broken down.  Patient escorted via wheelchair to [facility] for pickup by daughter in car.

## 2020-10-26 NOTE — DISCHARGE INSTRUCTIONS
Discharge Instructions    Discharged to home by car with relative. Discharged via wheelchair, hospital escort: Yes.  Special equipment needed: Cane    Be sure to schedule a follow-up appointment with your primary care doctor or any specialists as instructed.     Discharge Plan:   Diet Plan: Discussed  Activity Level: Discussed  Confirmed Follow up Appointment: Patient to Call and Schedule Appointment  Confirmed Symptoms Management: Discussed  Medication Reconciliation Updated: Yes  Influenza Vaccine Indication: Patient Refuses    I understand that a diet low in cholesterol, fat, and sodium is recommended for good health. Unless I have been given specific instructions below for another diet, I accept this instruction as my diet prescription.   Other diet: Cardiac    Special Instructions: None    · Is patient discharged on Warfarin / Coumadin?   Yes    You are receiving the drug warfarin. Please understand the importance of monitoring warfarin with scheduled PT/INR blood draws.  Follow-up with a call to your personal Doctor's office in 3 days to schedule a PT/INR. .    IMPORTANT: HOW TO USE THIS INFORMATION:  This is a summary and does NOT have all possible information about this product. This information does not assure that this product is safe, effective, or appropriate for you. This information is not individual medical advice and does not substitute for the advice of your health care professional. Always ask your health care professional for complete information about this product and your specific health needs.      WARFARIN - ORAL (WARF-uh-rin)      COMMON BRAND NAME(S): Coumadin      WARNING:  Warfarin can cause very serious (possibly fatal) bleeding. This is more likely to occur when you first start taking this medication or if you take too much warfarin. To decrease your risk for bleeding, your doctor or other health care provider will monitor you closely and check your lab results (INR test) to make sure  "you are not taking too much warfarin. Keep all medical and laboratory appointments. Tell your doctor right away if you notice any signs of serious bleeding. See also Side Effects section.      USES:  This medication is used to treat blood clots (such as in deep vein thrombosis-DVT or pulmonary embolus-PE) and/or to prevent new clots from forming in your body. Preventing harmful blood clots helps to reduce the risk of a stroke or heart attack. Conditions that increase your risk of developing blood clots include a certain type of irregular heart rhythm (atrial fibrillation), heart valve replacement, recent heart attack, and certain surgeries (such as hip/knee replacement). Warfarin is commonly called a \"blood thinner,\" but the more correct term is \"anticoagulant.\" It helps to keep blood flowing smoothly in your body by decreasing the amount of certain substances (clotting proteins) in your blood.      HOW TO USE:  Read the Medication Guide provided by your pharmacist before you start taking warfarin and each time you get a refill. If you have any questions, ask your doctor or pharmacist. Take this medication by mouth with or without food as directed by your doctor or other health care professional, usually once a day. It is very important to take it exactly as directed. Do not increase the dose, take it more frequently, or stop using it unless directed by your doctor. Dosage is based on your medical condition, laboratory tests (such as INR), and response to treatment. Your doctor or other health care provider will monitor you closely while you are taking this medication to determine the right dose for you. Use this medication regularly to get the most benefit from it. To help you remember, take it at the same time each day. It is important to eat a balanced, consistent diet while taking warfarin. Some foods can affect how warfarin works in your body and may affect your treatment and dose. Avoid sudden large increases " or decreases in your intake of foods high in vitamin K (such as broccoli, cauliflower, cabbage, brussels sprouts, kale, spinach, and other green leafy vegetables, liver, green tea, certain vitamin supplements). If you are trying to lose weight, check with your doctor before you try to go on a diet. Cranberry products may also affect how your warfarin works. Limit the amount of cranberry juice (16 ounces/480 milliliters a day) or other cranberry products you may drink or eat.      SIDE EFFECTS:  Nausea, loss of appetite, or stomach/abdominal pain may occur. If any of these effects persist or worsen, tell your doctor or pharmacist promptly. Remember that your doctor has prescribed this medication because he or she has judged that the benefit to you is greater than the risk of side effects. Many people using this medication do not have serious side effects. This medication can cause serious bleeding if it affects your blood clotting proteins too much (shown by unusually high INR lab results). Even if your doctor stops your medication, this risk of bleeding can continue for up to a week. Tell your doctor right away if you have any signs of serious bleeding, including: unusual pain/swelling/discomfort, unusual/easy bruising, prolonged bleeding from cuts or gums, persistent/frequent nosebleeds, unusually heavy/prolonged menstrual flow, pink/dark urine, coughing up blood, vomit that is bloody or looks like coffee grounds, severe headache, dizziness/fainting, unusual or persistent tiredness/weakness, bloody/black/tarry stools, chest pain, shortness of breath, difficulty swallowing. Tell your doctor right away if any of these unlikely but serious side effects occur: persistent nausea/vomiting, severe stomach/abdominal pain, yellowing eyes/skin. This drug rarely has caused very serious (possibly fatal) problems if its effects lead to small blood clots (usually at the beginning of treatment). This can lead to severe  skin/tissue damage that may require surgery or amputation if left untreated. Patients with certain blood conditions (protein C or S deficiency) may be at greater risk. Get medical help right away if any of these rare but serious side effects occur: painful/red/purplish patches on the skin (such as on the toe, breast, abdomen), change in the amount of urine, vision changes, confusion, slurred speech, weakness on one side of the body. A very serious allergic reaction to this drug is rare. However, get medical help right away if you notice any symptoms of a serious allergic reaction, including: rash, itching/swelling (especially of the face/tongue/throat), severe dizziness, trouble breathing. This is not a complete list of possible side effects. If you notice other effects not listed above, contact your doctor or pharmacist. In the [place] - Call your doctor for medical advice about side effects. You may report side effects to FDA at 6-493-LUZ-7936. In [place] - Call your doctor for medical advice about side effects. You may report side effects to [facility] [place] at [phone].      PRECAUTIONS:  Before taking warfarin, tell your doctor or pharmacist if you are allergic to it; or if you have any other allergies. This product may contain inactive ingredients, which can cause allergic reactions or other problems. Talk to your pharmacist for more details. Before using this medication, tell your doctor or pharmacist your medical history, especially of: blood disorders (such as anemia, hemophilia), bleeding problems (such as bleeding of the stomach/intestines, bleeding in the brain), blood vessel disorders (such as aneurysms), recent major injury/surgery, liver disease, alcohol use, mental/mood disorders (including memory problems), frequent falls/injuries. It is important that all your doctors and dentists know that you take warfarin. Before having surgery or any medical/dental procedures, tell your doctor or dentist that you  are taking this medication and about all the products you use (including prescription drugs, nonprescription drugs, and herbal products). Avoid getting injections into the muscles. If you must have an injection into a muscle (for example, a flu shot), it should be given in the arm. This way, it will be easier to check for bleeding and/or apply pressure bandages. This medication may cause stomach bleeding. Daily use of alcohol while using this medicine will increase your risk for stomach bleeding and may also affect how this medication works. Limit or avoid alcoholic beverages. If you have not been eating well, if you have an illness or infection that causes fever, vomiting, or diarrhea for more than 2 days, or if you start using any antibiotic medications, contact your doctor or pharmacist immediately because these conditions can affect how warfarin works. This medication can cause heavy bleeding. To lower the chance of getting cut, bruised, or injured, use great caution with sharp objects like safety razors and nail cutters. Use an electric razor when shaving and a soft toothbrush when brushing your teeth. Avoid activities such as contact sports. If you fall or injure yourself, especially if you hit your head, call your doctor immediately. Your doctor may need to check you. The Food & Drug Administration has stated that generic warfarin products are interchangeable. However, consult your doctor or pharmacist before switching warfarin products. Be careful not to take more than one medication that contains warfarin unless specifically directed by the doctor or health care provider who is monitoring your warfarin treatment. Older adults may be at greater risk for bleeding while using this drug. This medication is not recommended for use during pregnancy because of serious (possibly fatal) harm to an unborn baby. Discuss the use of reliable forms of birth control with your doctor. If you become pregnant or think you  "may be pregnant, tell your doctor immediately. If you are planning pregnancy, discuss a plan for managing your condition with your doctor before you become pregnant. Your doctor may switch the type of medication you use during pregnancy. Very small amounts of this medication may pass into breast milk but is unlikely to harm a nursing infant. Consult your doctor before breast-feeding.      DRUG INTERACTIONS:  Drug interactions may change how your medications work or increase your risk for serious side effects. This document does not contain all possible drug interactions. Keep a list of all the products you use (including prescription/nonprescription drugs and herbal products) and share it with your doctor and pharmacist. Do not start, stop, or change the dosage of any medicines without your doctor's approval. Warfarin interacts with many prescription, nonprescription, vitamin, and herbal products. This includes medications that are applied to the skin or inside the vagina or rectum. The interactions with warfarin usually result in an increase or decrease in the \"blood-thinning\" (anticoagulant) effect. Your doctor or other health care professional should closely monitor you to prevent serious bleeding or clotting problems. While taking warfarin, it is very important to tell your doctor or pharmacist of any changes in medications, vitamins, or herbal products that you are taking. Some products that may interact with this drug include: capecitabine, imatinib, mifepristone. Aspirin, aspirin-like drugs (salicylates), and nonsteroidal anti-inflammatory drugs (NSAIDs such as ibuprofen, naproxen, celecoxib) may have effects similar to warfarin. These drugs may increase the risk of bleeding problems if taken during treatment with warfarin. Carefully check all prescription/nonprescription product labels (including drugs applied to the skin such as pain-relieving creams) since the products may contain NSAIDs or salicylates. " Talk to your doctor about using a different medication (such as acetaminophen) to treat pain/fever. Low-dose aspirin and related drugs (such as clopidogrel, ticlopidine) should be continued if prescribed by your doctor for specific medical reasons such as heart attack or stroke prevention. Consult your doctor or pharmacist for more details. Many herbal products interact with warfarin. Tell your doctor before taking any herbal products, especially bromelains, coenzyme Q10, cranberry, danshen, dong quai, fenugreek, garlic, ginkgo biloba, ginseng, and Andree's wort, among others. This medication may interfere with a certain laboratory test to measure theophylline levels, possibly causing false test results. Make sure laboratory personnel and all your doctors know you use this drug.      OVERDOSE:  If overdose is suspected, contact a poison control center or emergency room immediately. [place] residents can call the [employer] Poison Hotline at 1-396.226.7598. [place] residents can call a provincial poison control center. Symptoms of overdose may include: bloody/black/tarry stools, pink/dark urine, unusual/prolonged bleeding.      NOTES:  Do not share this medication with others. Laboratory and/or medical tests (such as INR, complete blood count) must be performed periodically to monitor your progress or check for side effects. Consult your doctor for more details.      MISSED DOSE:  For the best possible benefit, do not miss any doses. If you do miss a dose and remember on the same day, take it as soon as you remember. If you remember on the next day, skip the missed dose and resume your usual dosing schedule. Do not double the dose to catch up because this could increase your risk for bleeding. Keep a record of missed doses to give to your doctor or pharmacist. Contact your doctor or pharmacist if you miss 2 or more doses in a row.      STORAGE:  Store at room temperature away from light and moisture. Do not store in the  bathroom. Keep all medications away from children and pets. Do not flush medications down the toilet or pour them into a drain unless instructed to do so. Properly discard this product when it is  or no longer needed. Consult your pharmacist or local waste disposal company for more details about how to safely discard your product.      MEDICAL ALERT:  Your condition and medication can cause complications in a medical emergency. For information about enrolling in MedicAlert, call 1-497.518.3832 ([place]) or 1-533.842.2347 ([place]).      Information last revised 2010 Copyright(c)  [employer]             Depression / Suicide Risk    As you are discharged from this [facility] facility, it is important to learn how to keep safe from harming yourself.    Recognize the warning signs:  · Abrupt changes in personality, positive or negative- including increase in energy   · Giving away possessions  · Change in eating patterns- significant weight changes-  positive or negative  · Change in sleeping patterns- unable to sleep or sleeping all the time   · Unwillingness or inability to communicate  · Depression  · Unusual sadness, discouragement and loneliness  · Talk of wanting to die  · Neglect of personal appearance   · Rebelliousness- reckless behavior  · Withdrawal from people/activities they love  · Confusion- inability to concentrate     If you or a loved one observes any of these behaviors or has concerns about self-harm, here's what you can do:  · Talk about it- your feelings and reasons for harming yourself  · Remove any means that you might use to hurt yourself (examples: pills, rope, extension cords, firearm)  · Get professional help from the community (Mental Health, Substance Abuse, psychological counseling)  · Do not be alone:Call your Safe Contact- someone whom you trust who will be there for you.  · Call your local CRISIS HOTLINE 496-6295 or 354-639-1998  · Call your local Children's  Mobile Crisis Response Team [place] (811) 495-0684 or www.[employer]  · Call the toll free National Suicide Prevention Hotlines   · National Suicide Prevention Lifeline 029-007-QNNZ (4461)  · [place] [employer] 800-SUICIDE (271-1147)      Syncope  Syncope is when you pass out (faint) for a short time. It is caused by a sudden decrease in blood flow to the brain. Signs that you may be about to pass out include:  · Feeling dizzy or light-headed.  · Feeling sick to your stomach (nauseous).  · Seeing all white or all black.  · Having cold, clammy skin.  If you pass out, get help right away. Call your local emergency services (911 in the [place] Do not drive yourself to the hospital.  Follow these instructions at home:  Watch for any changes in your symptoms. Take these actions to stay safe and help with your symptoms:  Lifestyle  · Do not drive, use machinery, or play sports until your doctor says it is okay.  · Do not drink alcohol.  · Do not use any products that contain nicotine or tobacco, such as cigarettes and e-cigarettes. If you need help quitting, ask your doctor.  · Drink enough fluid to keep your pee (urine) pale yellow.  General instructions  · Take over-the-counter and prescription medicines only as told by your doctor.  · If you are taking blood pressure or heart medicine, sit up and stand up slowly. Spend a few minutes getting ready to sit and then stand. This can help you feel less dizzy.  · Have someone stay with you until you feel stable.  · If you start to feel like you might pass out, lie down right away and raise (elevate) your feet above the level of your heart. Breathe deeply and steadily. Wait until all of the symptoms are gone.  · Keep all follow-up visits as told by your doctor. This is important.  Get help right away if:  · You have a very bad headache.  · You pass out once or more than once.  · You have pain in your chest, belly, or back.  · You have a very fast or uneven  heartbeat (palpitations).  · It hurts to breathe.  · You are bleeding from your mouth or your bottom (rectum).  · You have black or tarry poop (stool).  · You have jerky movements that you cannot control (seizure).  · You are confused.  · You have trouble walking.  · You are very weak.  · You have vision problems.  These symptoms may be an emergency. Do not wait to see if the symptoms will go away. Get medical help right away. Call your local emergency services (911 in the [place] Do not drive yourself to the hospital.  Summary  · Syncope is when you pass out (faint) for a short time. It is caused by a sudden decrease in blood flow to the brain.  · Signs that you may be about to faint include feeling dizzy, light-headed, or sick to your stomach, seeing all white or all black, or having cold, clammy skin.  · If you start to feel like you might pass out, lie down right away and raise (elevate) your feet above the level of your heart. Breathe deeply and steadily. Wait until all of the symptoms are gone.  This information is not intended to replace advice given to you by your health care provider. Make sure you discuss any questions you have with your health care provider.  Document Released: 06/05/2009 Document Revised: 01/30/2019 Document Reviewed: 01/30/2019  Elsevier Patient Education © 2020 [employer]

## 2020-10-27 NOTE — THERAPY
"Occupational Therapy   Initial Evaluation     Patient Name: Edwin Oliveros  Age:  87 y.o., Sex:  male  Medical Record #: [id number]  Today's Date: 10/26/2020     Precautions  Precautions: (P) Fall Risk    Assessment  Patient is 87 y.o. male who presents to acute due to syncope. PMH includes pacemaker, a fib, and AVR replacement. Pt reports he is typically independent w/ BADLs and has railings all over his home. He utilizes railings, SPC, w/c, and ATV to get around. Pt appears close to functional baseline. Recommend DC home w/ HH to ensure safe transition to natural environment.     Plan    Recommend Occupational Therapy Eval only    DC Equipment Recommendations: (P) None  Discharge Recommendations: (P) Recommend home health for continued occupational therapy services     Subjective    \"I take my ATV out in [place] all the time\"     Objective       10/26/20 1210   Total Time Spent   Total Time Spent (Mins) 22   Charge Group   OT Evaluation OT Evaluation Low   Initial Contact Note    Initial Contact Note Order Received and Verified, Occupational Therapy Evaluation in Progress with Full Report to Follow.   Prior Living Situation   Prior Services None   Housing / Facility 1 Story House   Bathroom Set up Walk In Shower;Grab Bars   Equipment Owned Single Point Cane;Wheelchair;Tub / Shower Seat   Lives with - Patient's Self Care Capacity Spouse   Comments Pt reports spouse stays on her side of the house, son appears to live on property? Pt reports he has bars all over his house and utilizes SPC. Reports he uses ATV to get around yard   Prior Level of ADL Function   Self Feeding Independent   Grooming / Hygiene Independent   Bathing Independent   Dressing Independent   Toileting Independent   Prior Level of IADL Function   Medication Management Independent   Finances Independent   Home Management Independent   Driving / Transportation Driving Independent   Precautions   Precautions Fall Risk   Vitals   O2 (LPM) 0   O2 Delivery " Device None - Room Air   Pain 0 - 10 Group   Therapist Pain Assessment Post Activity Pain Same as Prior to Activity;Nurse Notified  (no c/o pain )   Cognition    Cognition / Consciousness WDL   Level of Consciousness Alert   Comments pleasent and cooperative    Active ROM Upper Body   Active ROM Upper Body  WDL   Strength Upper Body   Comments WFL   Coordination Upper Body   Coordination WDL   Balance Assessment   Sitting Balance (Static) Good   Sitting Balance (Dynamic) Good   Standing Balance (Static) Fair   Standing Balance (Dynamic) Fair -   Weight Shift Sitting Good   Weight Shift Standing Fair   Comments w/ SPC and handrail    Bed Mobility    Supine to Sit Supervised   Sit to Supine   (NT in chair post)   Scooting Supervised   Rolling Supervised   ADL Assessment   Grooming Supervision;Standing   Upper Body Dressing Supervision   Lower Body Dressing Supervision   How much help from another person does the patient currently need...   Putting on and taking off regular lower body clothing? 3   Bathing (including washing, rinsing, and drying)? 3   Toileting, which includes using a toilet, bedpan, or urinal? 4   Putting on and taking off regular upper body clothing? 4   Taking care of personal grooming such as brushing teeth? 4   Eating meals? 4   6 Clicks Daily Activity Score 22   Functional Mobility   Sit to Stand Supervised   Bed, Chair, Wheelchair Transfer Supervised   Mobility within room w/ B UE support   Activity Tolerance   Sitting in Chair 10+ min (up post)   Sitting Edge of Bed 6 min   Standing 8 min   Education Group   Role of Occupational Therapist Patient Response Patient;Acceptance;Demonstration;Explanation;Verbal Demonstration;Action Demonstration   Anticipated Discharge Equipment and Recommendations   DC Equipment Recommendations None   Discharge Recommendations Recommend home health for continued occupational therapy services   Interdisciplinary Plan of Care Collaboration   IDT Collaboration with   Nursing;Physical Therapist   Patient Position at End of Therapy Seated;Chair Alarm On;Call Light within Reach;Tray Table within Reach;Phone within Reach   Collaboration Comments report given   Session Information   Date / Session Number  10/26, 1x only

## 2021-01-14 DIAGNOSIS — Z23 NEED FOR VACCINATION: ICD-10-CM

## 2021-07-01 ENCOUNTER — HOSPITAL ENCOUNTER (OUTPATIENT)
Dept: HOSPITAL 8 - ED | Age: 86
Setting detail: OBSERVATION
LOS: 1 days | Discharge: HOME | End: 2021-07-02
Attending: INTERNAL MEDICINE | Admitting: HOSPITALIST
Payer: MEDICARE

## 2021-07-01 VITALS — BODY MASS INDEX: 20.31 KG/M2 | HEIGHT: 73 IN | WEIGHT: 153.22 LBS

## 2021-07-01 VITALS — SYSTOLIC BLOOD PRESSURE: 162 MMHG | DIASTOLIC BLOOD PRESSURE: 85 MMHG

## 2021-07-01 VITALS — DIASTOLIC BLOOD PRESSURE: 93 MMHG | SYSTOLIC BLOOD PRESSURE: 185 MMHG

## 2021-07-01 DIAGNOSIS — R79.89: ICD-10-CM

## 2021-07-01 DIAGNOSIS — E11.9: ICD-10-CM

## 2021-07-01 DIAGNOSIS — R56.9: Primary | ICD-10-CM

## 2021-07-01 DIAGNOSIS — Z72.9: ICD-10-CM

## 2021-07-01 DIAGNOSIS — Z79.899: ICD-10-CM

## 2021-07-01 DIAGNOSIS — Z85.46: ICD-10-CM

## 2021-07-01 DIAGNOSIS — M48.02: ICD-10-CM

## 2021-07-01 DIAGNOSIS — N39.0: ICD-10-CM

## 2021-07-01 DIAGNOSIS — I48.91: ICD-10-CM

## 2021-07-01 DIAGNOSIS — R07.89: ICD-10-CM

## 2021-07-01 DIAGNOSIS — Z95.0: ICD-10-CM

## 2021-07-01 DIAGNOSIS — I10: ICD-10-CM

## 2021-07-01 DIAGNOSIS — Z66: ICD-10-CM

## 2021-07-01 DIAGNOSIS — Z79.01: ICD-10-CM

## 2021-07-01 LAB
ALBUMIN SERPL-MCNC: 3.8 G/DL (ref 3.4–5)
ALP SERPL-CCNC: 64 U/L (ref 45–117)
ALT SERPL-CCNC: 24 U/L (ref 12–78)
ANION GAP SERPL CALC-SCNC: 11 MMOL/L (ref 5–15)
BASOPHILS # BLD AUTO: 0 X10^3/UL (ref 0–0.1)
BASOPHILS NFR BLD AUTO: 1 % (ref 0–1)
BILIRUB SERPL-MCNC: 0.9 MG/DL (ref 0.2–1)
CALCIUM SERPL-MCNC: 9.2 MG/DL (ref 8.5–10.1)
CHLORIDE SERPL-SCNC: 102 MMOL/L (ref 98–107)
CREAT SERPL-MCNC: 0.97 MG/DL (ref 0.7–1.3)
EOSINOPHIL # BLD AUTO: 0.1 X10^3/UL (ref 0–0.4)
EOSINOPHIL NFR BLD AUTO: 2 % (ref 1–7)
ERYTHROCYTE [DISTWIDTH] IN BLOOD BY AUTOMATED COUNT: 14.7 % (ref 9.4–14.8)
INR PPP: 2.5 (ref 0.93–1.1)
LYMPHOCYTES # BLD AUTO: 0.8 X10^3/UL (ref 1–3.4)
LYMPHOCYTES NFR BLD AUTO: 12 % (ref 22–44)
MCH RBC QN AUTO: 31 PG (ref 27.5–34.5)
MCHC RBC AUTO-ENTMCNC: 33.4 G/DL (ref 33.2–36.2)
MONOCYTES # BLD AUTO: 0.5 X10^3/UL (ref 0.2–0.8)
MONOCYTES NFR BLD AUTO: 8 % (ref 2–9)
NEUTROPHILS # BLD AUTO: 4.9 X10^3/UL (ref 1.8–6.8)
NEUTROPHILS NFR BLD AUTO: 78 % (ref 42–75)
PLATELET # BLD AUTO: 212 X10^3/UL (ref 130–400)
PMV BLD AUTO: 7.7 FL (ref 7.4–10.4)
PROT SERPL-MCNC: 7.2 G/DL (ref 6.4–8.2)
PROTHROMBIN TIME: 25.6 SECONDS (ref 9.6–11.5)
RBC # BLD AUTO: 4.25 X10^6/UL (ref 4.38–5.82)
TROPONIN I SERPL-MCNC: 0.12 NG/ML (ref 0–0.04)
TROPONIN I SERPL-MCNC: 0.2 NG/ML (ref 0–0.04)

## 2021-07-01 PROCEDURE — C9898 INPNT STAY RADIOLABELED ITEM: HCPCS

## 2021-07-01 PROCEDURE — 72125 CT NECK SPINE W/O DYE: CPT

## 2021-07-01 PROCEDURE — 80053 COMPREHEN METABOLIC PANEL: CPT

## 2021-07-01 PROCEDURE — 84484 ASSAY OF TROPONIN QUANT: CPT

## 2021-07-01 PROCEDURE — 93017 CV STRESS TEST TRACING ONLY: CPT

## 2021-07-01 PROCEDURE — 96374 THER/PROPH/DIAG INJ IV PUSH: CPT

## 2021-07-01 PROCEDURE — G0378 HOSPITAL OBSERVATION PER HR: HCPCS

## 2021-07-01 PROCEDURE — 85610 PROTHROMBIN TIME: CPT

## 2021-07-01 PROCEDURE — 85025 COMPLETE CBC W/AUTO DIFF WBC: CPT

## 2021-07-01 PROCEDURE — 71045 X-RAY EXAM CHEST 1 VIEW: CPT

## 2021-07-01 PROCEDURE — 70450 CT HEAD/BRAIN W/O DYE: CPT

## 2021-07-01 PROCEDURE — 78452 HT MUSCLE IMAGE SPECT MULT: CPT

## 2021-07-01 PROCEDURE — 99285 EMERGENCY DEPT VISIT HI MDM: CPT

## 2021-07-01 PROCEDURE — A9502 TC99M TETROFOSMIN: HCPCS

## 2021-07-01 PROCEDURE — 36415 COLL VENOUS BLD VENIPUNCTURE: CPT

## 2021-07-01 PROCEDURE — 93005 ELECTROCARDIOGRAM TRACING: CPT

## 2021-07-01 RX ADMIN — Medication SCH NOTE: at 18:38

## 2021-07-01 RX ADMIN — SULFAMETHOXAZOLE AND TRIMETHOPRIM SCH TAB: 400; 80 TABLET ORAL at 21:08

## 2021-07-01 RX ADMIN — LEVETIRACETAM SCH MG: 500 TABLET ORAL at 21:06

## 2021-07-02 VITALS — DIASTOLIC BLOOD PRESSURE: 67 MMHG | SYSTOLIC BLOOD PRESSURE: 100 MMHG

## 2021-07-02 VITALS — DIASTOLIC BLOOD PRESSURE: 80 MMHG | SYSTOLIC BLOOD PRESSURE: 133 MMHG

## 2021-07-02 VITALS — SYSTOLIC BLOOD PRESSURE: 95 MMHG | DIASTOLIC BLOOD PRESSURE: 53 MMHG

## 2021-07-02 VITALS — SYSTOLIC BLOOD PRESSURE: 94 MMHG | DIASTOLIC BLOOD PRESSURE: 60 MMHG

## 2021-07-02 VITALS — DIASTOLIC BLOOD PRESSURE: 58 MMHG | SYSTOLIC BLOOD PRESSURE: 93 MMHG

## 2021-07-02 LAB
INR PPP: 4.04 (ref 0.93–1.1)
PROTHROMBIN TIME: 40.4 SECONDS (ref 9.6–11.5)
TROPONIN I SERPL-MCNC: 0.18 NG/ML (ref 0–0.04)

## 2021-07-02 RX ADMIN — SULFAMETHOXAZOLE AND TRIMETHOPRIM SCH TAB: 400; 80 TABLET ORAL at 08:43

## 2021-07-02 RX ADMIN — Medication SCH NOTE: at 08:17

## 2021-07-02 RX ADMIN — LEVETIRACETAM SCH MG: 500 TABLET ORAL at 08:45

## 2021-09-24 ENCOUNTER — HOSPITAL ENCOUNTER (INPATIENT)
Dept: HOSPITAL 8 - ED | Age: 86
LOS: 3 days | Discharge: HOME HEALTH | DRG: 377 | End: 2021-09-27
Attending: STUDENT IN AN ORGANIZED HEALTH CARE EDUCATION/TRAINING PROGRAM | Admitting: FAMILY MEDICINE
Payer: MEDICARE

## 2021-09-24 VITALS — DIASTOLIC BLOOD PRESSURE: 70 MMHG | SYSTOLIC BLOOD PRESSURE: 109 MMHG

## 2021-09-24 VITALS — SYSTOLIC BLOOD PRESSURE: 126 MMHG | DIASTOLIC BLOOD PRESSURE: 74 MMHG

## 2021-09-24 VITALS — DIASTOLIC BLOOD PRESSURE: 63 MMHG | SYSTOLIC BLOOD PRESSURE: 109 MMHG

## 2021-09-24 VITALS — DIASTOLIC BLOOD PRESSURE: 65 MMHG | SYSTOLIC BLOOD PRESSURE: 99 MMHG

## 2021-09-24 VITALS — DIASTOLIC BLOOD PRESSURE: 69 MMHG | SYSTOLIC BLOOD PRESSURE: 119 MMHG

## 2021-09-24 VITALS — DIASTOLIC BLOOD PRESSURE: 92 MMHG | SYSTOLIC BLOOD PRESSURE: 146 MMHG

## 2021-09-24 VITALS — SYSTOLIC BLOOD PRESSURE: 109 MMHG | DIASTOLIC BLOOD PRESSURE: 70 MMHG

## 2021-09-24 VITALS — DIASTOLIC BLOOD PRESSURE: 66 MMHG | SYSTOLIC BLOOD PRESSURE: 113 MMHG

## 2021-09-24 VITALS — DIASTOLIC BLOOD PRESSURE: 47 MMHG | SYSTOLIC BLOOD PRESSURE: 74 MMHG

## 2021-09-24 VITALS — SYSTOLIC BLOOD PRESSURE: 126 MMHG | DIASTOLIC BLOOD PRESSURE: 84 MMHG

## 2021-09-24 VITALS — HEIGHT: 72 IN | BODY MASS INDEX: 22.28 KG/M2 | WEIGHT: 164.46 LBS

## 2021-09-24 VITALS — SYSTOLIC BLOOD PRESSURE: 129 MMHG | DIASTOLIC BLOOD PRESSURE: 62 MMHG

## 2021-09-24 VITALS — DIASTOLIC BLOOD PRESSURE: 77 MMHG | SYSTOLIC BLOOD PRESSURE: 109 MMHG

## 2021-09-24 VITALS — DIASTOLIC BLOOD PRESSURE: 68 MMHG | SYSTOLIC BLOOD PRESSURE: 108 MMHG

## 2021-09-24 VITALS — SYSTOLIC BLOOD PRESSURE: 131 MMHG | DIASTOLIC BLOOD PRESSURE: 81 MMHG

## 2021-09-24 VITALS — DIASTOLIC BLOOD PRESSURE: 64 MMHG | SYSTOLIC BLOOD PRESSURE: 106 MMHG

## 2021-09-24 VITALS — SYSTOLIC BLOOD PRESSURE: 62 MMHG | DIASTOLIC BLOOD PRESSURE: 44 MMHG

## 2021-09-24 DIAGNOSIS — R57.8: ICD-10-CM

## 2021-09-24 DIAGNOSIS — N39.0: ICD-10-CM

## 2021-09-24 DIAGNOSIS — Z95.3: ICD-10-CM

## 2021-09-24 DIAGNOSIS — D68.69: ICD-10-CM

## 2021-09-24 DIAGNOSIS — Z85.810: ICD-10-CM

## 2021-09-24 DIAGNOSIS — Z90.49: ICD-10-CM

## 2021-09-24 DIAGNOSIS — E11.9: ICD-10-CM

## 2021-09-24 DIAGNOSIS — K64.8: ICD-10-CM

## 2021-09-24 DIAGNOSIS — Z92.3: ICD-10-CM

## 2021-09-24 DIAGNOSIS — K55.21: Primary | ICD-10-CM

## 2021-09-24 DIAGNOSIS — I10: ICD-10-CM

## 2021-09-24 DIAGNOSIS — Z85.46: ICD-10-CM

## 2021-09-24 DIAGNOSIS — Z87.440: ICD-10-CM

## 2021-09-24 DIAGNOSIS — E78.5: ICD-10-CM

## 2021-09-24 DIAGNOSIS — Z79.84: ICD-10-CM

## 2021-09-24 DIAGNOSIS — G40.909: ICD-10-CM

## 2021-09-24 DIAGNOSIS — R33.8: ICD-10-CM

## 2021-09-24 DIAGNOSIS — Z91.19: ICD-10-CM

## 2021-09-24 DIAGNOSIS — K64.4: ICD-10-CM

## 2021-09-24 DIAGNOSIS — N40.1: ICD-10-CM

## 2021-09-24 DIAGNOSIS — K57.31: ICD-10-CM

## 2021-09-24 DIAGNOSIS — Z20.822: ICD-10-CM

## 2021-09-24 DIAGNOSIS — Z91.14: ICD-10-CM

## 2021-09-24 DIAGNOSIS — K56.7: ICD-10-CM

## 2021-09-24 DIAGNOSIS — Z95.0: ICD-10-CM

## 2021-09-24 DIAGNOSIS — I48.20: ICD-10-CM

## 2021-09-24 DIAGNOSIS — Z79.01: ICD-10-CM

## 2021-09-24 DIAGNOSIS — Z92.21: ICD-10-CM

## 2021-09-24 DIAGNOSIS — D62: ICD-10-CM

## 2021-09-24 LAB
ALBUMIN SERPL-MCNC: 2.3 G/DL (ref 3.4–5)
ALP SERPL-CCNC: 68 U/L (ref 45–117)
ALT SERPL-CCNC: 14 U/L (ref 12–78)
ANION GAP SERPL CALC-SCNC: 4 MMOL/L (ref 5–15)
APTT BLD: 40 SECONDS (ref 25–31)
BASOPHILS # BLD AUTO: 0.1 X10^3/UL (ref 0–0.1)
BASOPHILS NFR BLD AUTO: 1 % (ref 0–1)
BILIRUB SERPL-MCNC: 0.8 MG/DL (ref 0.2–1)
CALCIUM SERPL-MCNC: 8.3 MG/DL (ref 8.5–10.1)
CHLORIDE SERPL-SCNC: 107 MMOL/L (ref 98–107)
CREAT SERPL-MCNC: 0.64 MG/DL (ref 0.7–1.3)
EOSINOPHIL # BLD AUTO: 0.1 X10^3/UL (ref 0–0.4)
EOSINOPHIL NFR BLD AUTO: 1 % (ref 1–7)
ERYTHROCYTE [DISTWIDTH] IN BLOOD BY AUTOMATED COUNT: 15.5 % (ref 9.4–14.8)
INR PPP: 1.88 (ref 0.93–1.1)
LYMPHOCYTES # BLD AUTO: 0.6 X10^3/UL (ref 1–3.4)
LYMPHOCYTES NFR BLD AUTO: 14 % (ref 22–44)
MCH RBC QN AUTO: 30.4 PG (ref 27.5–34.5)
MCHC RBC AUTO-ENTMCNC: 33.3 G/DL (ref 33.2–36.2)
MICROSCOPIC: (no result)
MONOCYTES # BLD AUTO: 0.4 X10^3/UL (ref 0.2–0.8)
MONOCYTES NFR BLD AUTO: 9 % (ref 2–9)
NEUTROPHILS # BLD AUTO: 3.5 X10^3/UL (ref 1.8–6.8)
NEUTROPHILS NFR BLD AUTO: 75 % (ref 42–75)
PLATELET # BLD AUTO: 266 X10^3/UL (ref 130–400)
PMV BLD AUTO: 7.5 FL (ref 7.4–10.4)
PROT SERPL-MCNC: 5.5 G/DL (ref 6.4–8.2)
PROTHROMBIN TIME: 19.5 SECONDS (ref 9.6–11.5)
RBC # BLD AUTO: 2.54 X10^6/UL (ref 4.38–5.82)

## 2021-09-24 PROCEDURE — 85025 COMPLETE CBC W/AUTO DIFF WBC: CPT

## 2021-09-24 PROCEDURE — 36573 INSJ PICC RS&I 5 YR+: CPT

## 2021-09-24 PROCEDURE — B548ZZA ULTRASONOGRAPHY OF SUPERIOR VENA CAVA, GUIDANCE: ICD-10-PCS | Performed by: RADIOLOGY

## 2021-09-24 PROCEDURE — C9113 INJ PANTOPRAZOLE SODIUM, VIA: HCPCS

## 2021-09-24 PROCEDURE — 99291 CRITICAL CARE FIRST HOUR: CPT

## 2021-09-24 PROCEDURE — 37244 VASC EMBOLIZE/OCCLUDE BLEED: CPT

## 2021-09-24 PROCEDURE — 85610 PROTHROMBIN TIME: CPT

## 2021-09-24 PROCEDURE — 81001 URINALYSIS AUTO W/SCOPE: CPT

## 2021-09-24 PROCEDURE — B5181ZA FLUOROSCOPY OF SUPERIOR VENA CAVA USING LOW OSMOLAR CONTRAST, GUIDANCE: ICD-10-PCS | Performed by: RADIOLOGY

## 2021-09-24 PROCEDURE — 83735 ASSAY OF MAGNESIUM: CPT

## 2021-09-24 PROCEDURE — 86923 COMPATIBILITY TEST ELECTRIC: CPT

## 2021-09-24 PROCEDURE — C1751 CATH, INF, PER/CENT/MIDLINE: HCPCS

## 2021-09-24 PROCEDURE — 99156 MOD SED OTH PHYS/QHP 5/>YRS: CPT

## 2021-09-24 PROCEDURE — 87077 CULTURE AEROBIC IDENTIFY: CPT

## 2021-09-24 PROCEDURE — 80053 COMPREHEN METABOLIC PANEL: CPT

## 2021-09-24 PROCEDURE — 74174 CTA ABD&PLVS W/CONTRAST: CPT

## 2021-09-24 PROCEDURE — P9016 RBC LEUKOCYTES REDUCED: HCPCS

## 2021-09-24 PROCEDURE — 84100 ASSAY OF PHOSPHORUS: CPT

## 2021-09-24 PROCEDURE — 02HV33Z INSERTION OF INFUSION DEVICE INTO SUPERIOR VENA CAVA, PERCUTANEOUS APPROACH: ICD-10-PCS | Performed by: RADIOLOGY

## 2021-09-24 PROCEDURE — 96361 HYDRATE IV INFUSION ADD-ON: CPT

## 2021-09-24 PROCEDURE — 86850 RBC ANTIBODY SCREEN: CPT

## 2021-09-24 PROCEDURE — U0003 INFECTIOUS AGENT DETECTION BY NUCLEIC ACID (DNA OR RNA); SEVERE ACUTE RESPIRATORY SYNDROME CORONAVIRUS 2 (SARS-COV-2) (CORONAVIRUS DISEASE [COVID-19]), AMPLIFIED PROBE TECHNIQUE, MAKING USE OF HIGH THROUGHPUT TECHNOLOGIES AS DESCRIBED BY CMS-2020-01-R: HCPCS

## 2021-09-24 PROCEDURE — 36415 COLL VENOUS BLD VENIPUNCTURE: CPT

## 2021-09-24 PROCEDURE — 80061 LIPID PANEL: CPT

## 2021-09-24 PROCEDURE — 85014 HEMATOCRIT: CPT

## 2021-09-24 PROCEDURE — C9132 KCENTRA, PER I.U.: HCPCS

## 2021-09-24 PROCEDURE — 83690 ASSAY OF LIPASE: CPT

## 2021-09-24 PROCEDURE — 84443 ASSAY THYROID STIM HORMONE: CPT

## 2021-09-24 PROCEDURE — 99157 MOD SED OTHER PHYS/QHP EA: CPT

## 2021-09-24 PROCEDURE — 80048 BASIC METABOLIC PNL TOTAL CA: CPT

## 2021-09-24 PROCEDURE — 85018 HEMOGLOBIN: CPT

## 2021-09-24 PROCEDURE — 85730 THROMBOPLASTIN TIME PARTIAL: CPT

## 2021-09-24 PROCEDURE — B4151ZZ FLUOROSCOPY OF INFERIOR MESENTERIC ARTERY USING LOW OSMOLAR CONTRAST: ICD-10-PCS | Performed by: RADIOLOGY

## 2021-09-24 PROCEDURE — 30233N1 TRANSFUSION OF NONAUTOLOGOUS RED BLOOD CELLS INTO PERIPHERAL VEIN, PERCUTANEOUS APPROACH: ICD-10-PCS | Performed by: RADIOLOGY

## 2021-09-24 PROCEDURE — B4141ZZ FLUOROSCOPY OF SUPERIOR MESENTERIC ARTERY USING LOW OSMOLAR CONTRAST: ICD-10-PCS | Performed by: RADIOLOGY

## 2021-09-24 PROCEDURE — 96374 THER/PROPH/DIAG INJ IV PUSH: CPT

## 2021-09-24 PROCEDURE — C1769 GUIDE WIRE: HCPCS

## 2021-09-24 PROCEDURE — 83036 HEMOGLOBIN GLYCOSYLATED A1C: CPT

## 2021-09-24 PROCEDURE — 86900 BLOOD TYPING SEROLOGIC ABO: CPT

## 2021-09-24 PROCEDURE — 87186 SC STD MICRODIL/AGAR DIL: CPT

## 2021-09-24 PROCEDURE — C1894 INTRO/SHEATH, NON-LASER: HCPCS

## 2021-09-24 PROCEDURE — 87086 URINE CULTURE/COLONY COUNT: CPT

## 2021-09-24 RX ADMIN — PANTOPRAZOLE SODIUM SCH MG: 40 INJECTION, POWDER, FOR SOLUTION INTRAVENOUS at 16:50

## 2021-09-24 RX ADMIN — SODIUM CHLORIDE SCH MLS/HR: 0.9 INJECTION, SOLUTION INTRAVENOUS at 16:50

## 2021-09-24 RX ADMIN — LEVETIRACETAM SCH MLS/HR: 100 INJECTION, SOLUTION, CONCENTRATE INTRAVENOUS at 22:22

## 2021-09-25 VITALS — SYSTOLIC BLOOD PRESSURE: 124 MMHG | DIASTOLIC BLOOD PRESSURE: 63 MMHG

## 2021-09-25 VITALS — DIASTOLIC BLOOD PRESSURE: 64 MMHG | SYSTOLIC BLOOD PRESSURE: 94 MMHG

## 2021-09-25 VITALS — DIASTOLIC BLOOD PRESSURE: 62 MMHG | SYSTOLIC BLOOD PRESSURE: 109 MMHG

## 2021-09-25 VITALS — DIASTOLIC BLOOD PRESSURE: 69 MMHG | SYSTOLIC BLOOD PRESSURE: 127 MMHG

## 2021-09-25 VITALS — SYSTOLIC BLOOD PRESSURE: 100 MMHG | DIASTOLIC BLOOD PRESSURE: 51 MMHG

## 2021-09-25 VITALS — DIASTOLIC BLOOD PRESSURE: 63 MMHG | SYSTOLIC BLOOD PRESSURE: 104 MMHG

## 2021-09-25 VITALS — SYSTOLIC BLOOD PRESSURE: 115 MMHG | DIASTOLIC BLOOD PRESSURE: 68 MMHG

## 2021-09-25 VITALS — SYSTOLIC BLOOD PRESSURE: 107 MMHG | DIASTOLIC BLOOD PRESSURE: 65 MMHG

## 2021-09-25 VITALS — SYSTOLIC BLOOD PRESSURE: 98 MMHG | DIASTOLIC BLOOD PRESSURE: 59 MMHG

## 2021-09-25 VITALS — SYSTOLIC BLOOD PRESSURE: 132 MMHG | DIASTOLIC BLOOD PRESSURE: 70 MMHG

## 2021-09-25 VITALS — SYSTOLIC BLOOD PRESSURE: 99 MMHG | DIASTOLIC BLOOD PRESSURE: 62 MMHG

## 2021-09-25 VITALS — DIASTOLIC BLOOD PRESSURE: 71 MMHG | SYSTOLIC BLOOD PRESSURE: 105 MMHG

## 2021-09-25 VITALS — SYSTOLIC BLOOD PRESSURE: 100 MMHG | DIASTOLIC BLOOD PRESSURE: 65 MMHG

## 2021-09-25 VITALS — DIASTOLIC BLOOD PRESSURE: 51 MMHG | SYSTOLIC BLOOD PRESSURE: 100 MMHG

## 2021-09-25 VITALS — SYSTOLIC BLOOD PRESSURE: 105 MMHG | DIASTOLIC BLOOD PRESSURE: 60 MMHG

## 2021-09-25 VITALS — SYSTOLIC BLOOD PRESSURE: 102 MMHG | DIASTOLIC BLOOD PRESSURE: 57 MMHG

## 2021-09-25 VITALS — SYSTOLIC BLOOD PRESSURE: 127 MMHG | DIASTOLIC BLOOD PRESSURE: 69 MMHG

## 2021-09-25 LAB
ALBUMIN SERPL-MCNC: 1.9 G/DL (ref 3.4–5)
ALP SERPL-CCNC: 57 U/L (ref 45–117)
ALT SERPL-CCNC: 13 U/L (ref 12–78)
ANION GAP SERPL CALC-SCNC: 5 MMOL/L (ref 5–15)
BILIRUB SERPL-MCNC: 1.9 MG/DL (ref 0.2–1)
CALCIUM SERPL-MCNC: 7.6 MG/DL (ref 8.5–10.1)
CHLORIDE SERPL-SCNC: 111 MMOL/L (ref 98–107)
CHOL/HDL RATIO: 3.4
CREAT SERPL-MCNC: 0.49 MG/DL (ref 0.7–1.3)
EST. AVERAGE GLUCOSE BLD GHB EST-MCNC: 103 MG/DL (ref 0–126)
HDL CHOL %: 30 % (ref 26–37)
HDL CHOLESTEROL (DIRECT): 25 MG/DL (ref 40–60)
LDL CHOLESTEROL,CALCULATED: 43 MG/DL (ref 54–169)
LDLC/HDLC SERPL: 1.7 {RATIO} (ref 0.5–3)
PROT SERPL-MCNC: 4.7 G/DL (ref 6.4–8.2)
TRIGL SERPL-MCNC: 79 MG/DL (ref 50–200)
VLDLC SERPL CALC-MCNC: 16 MG/DL (ref 0–25)

## 2021-09-25 RX ADMIN — CEFTRIAXONE SCH MLS/HR: 2 INJECTION, POWDER, FOR SOLUTION INTRAMUSCULAR; INTRAVENOUS at 10:16

## 2021-09-25 RX ADMIN — LIOTHYRONINE SODIUM SCH MCG: 5 TABLET ORAL at 07:53

## 2021-09-25 RX ADMIN — TAMSULOSIN HYDROCHLORIDE SCH MG: 0.4 CAPSULE ORAL at 07:53

## 2021-09-25 RX ADMIN — LEVETIRACETAM SCH MLS/HR: 100 INJECTION, SOLUTION, CONCENTRATE INTRAVENOUS at 20:46

## 2021-09-25 RX ADMIN — LEVETIRACETAM SCH MLS/HR: 100 INJECTION, SOLUTION, CONCENTRATE INTRAVENOUS at 07:53

## 2021-09-25 RX ADMIN — PANTOPRAZOLE SODIUM SCH MG: 40 INJECTION, POWDER, FOR SOLUTION INTRAVENOUS at 12:01

## 2021-09-25 RX ADMIN — PANTOPRAZOLE SODIUM SCH MG: 40 INJECTION, POWDER, FOR SOLUTION INTRAVENOUS at 01:19

## 2021-09-25 RX ADMIN — SODIUM CHLORIDE SCH MLS/HR: 0.9 INJECTION, SOLUTION INTRAVENOUS at 01:15

## 2021-09-26 VITALS — SYSTOLIC BLOOD PRESSURE: 119 MMHG | DIASTOLIC BLOOD PRESSURE: 119 MMHG

## 2021-09-26 VITALS — SYSTOLIC BLOOD PRESSURE: 128 MMHG | DIASTOLIC BLOOD PRESSURE: 77 MMHG

## 2021-09-26 VITALS — SYSTOLIC BLOOD PRESSURE: 143 MMHG | DIASTOLIC BLOOD PRESSURE: 77 MMHG

## 2021-09-26 VITALS — SYSTOLIC BLOOD PRESSURE: 129 MMHG | DIASTOLIC BLOOD PRESSURE: 79 MMHG

## 2021-09-26 LAB
ANION GAP SERPL CALC-SCNC: 5 MMOL/L (ref 5–15)
BASOPHILS # BLD AUTO: 0.1 X10^3/UL (ref 0–0.1)
BASOPHILS NFR BLD AUTO: 1 % (ref 0–1)
CALCIUM SERPL-MCNC: 7.7 MG/DL (ref 8.5–10.1)
CHLORIDE SERPL-SCNC: 109 MMOL/L (ref 98–107)
CREAT SERPL-MCNC: 0.53 MG/DL (ref 0.7–1.3)
EOSINOPHIL # BLD AUTO: 0.1 X10^3/UL (ref 0–0.4)
EOSINOPHIL NFR BLD AUTO: 1 % (ref 1–7)
ERYTHROCYTE [DISTWIDTH] IN BLOOD BY AUTOMATED COUNT: 16.8 % (ref 9.4–14.8)
LYMPHOCYTES # BLD AUTO: 0.6 X10^3/UL (ref 1–3.4)
LYMPHOCYTES NFR BLD AUTO: 9 % (ref 22–44)
MCH RBC QN AUTO: 29.7 PG (ref 27.5–34.5)
MCHC RBC AUTO-ENTMCNC: 33.9 G/DL (ref 33.2–36.2)
MONOCYTES # BLD AUTO: 0.6 X10^3/UL (ref 0.2–0.8)
MONOCYTES NFR BLD AUTO: 8 % (ref 2–9)
NEUTROPHILS # BLD AUTO: 5.6 X10^3/UL (ref 1.8–6.8)
NEUTROPHILS NFR BLD AUTO: 81 % (ref 42–75)
PLATELET # BLD AUTO: 208 X10^3/UL (ref 130–400)
PMV BLD AUTO: 7.6 FL (ref 7.4–10.4)
RBC # BLD AUTO: 2.93 X10^6/UL (ref 4.38–5.82)

## 2021-09-26 PROCEDURE — 0D5K8ZZ DESTRUCTION OF ASCENDING COLON, VIA NATURAL OR ARTIFICIAL OPENING ENDOSCOPIC: ICD-10-PCS | Performed by: INTERNAL MEDICINE

## 2021-09-26 RX ADMIN — PSYLLIUM HUSK SCH PACKET: 3.4 GRANULE ORAL at 09:28

## 2021-09-26 RX ADMIN — LEVETIRACETAM SCH MLS/HR: 100 INJECTION, SOLUTION, CONCENTRATE INTRAVENOUS at 09:22

## 2021-09-26 RX ADMIN — PANTOPRAZOLE SODIUM SCH MG: 40 INJECTION, POWDER, FOR SOLUTION INTRAVENOUS at 01:41

## 2021-09-26 RX ADMIN — TAMSULOSIN HYDROCHLORIDE SCH MG: 0.4 CAPSULE ORAL at 09:22

## 2021-09-26 RX ADMIN — CEFTRIAXONE SCH MLS/HR: 2 INJECTION, POWDER, FOR SOLUTION INTRAMUSCULAR; INTRAVENOUS at 09:22

## 2021-09-26 RX ADMIN — PANTOPRAZOLE SODIUM SCH MG: 40 INJECTION, POWDER, FOR SOLUTION INTRAVENOUS at 12:23

## 2021-09-26 RX ADMIN — LIOTHYRONINE SODIUM SCH MCG: 5 TABLET ORAL at 09:22

## 2021-09-26 RX ADMIN — LEVETIRACETAM SCH MLS/HR: 100 INJECTION, SOLUTION, CONCENTRATE INTRAVENOUS at 20:30

## 2021-09-27 VITALS — SYSTOLIC BLOOD PRESSURE: 121 MMHG | DIASTOLIC BLOOD PRESSURE: 68 MMHG

## 2021-09-27 VITALS — DIASTOLIC BLOOD PRESSURE: 75 MMHG | SYSTOLIC BLOOD PRESSURE: 132 MMHG

## 2021-09-27 VITALS — SYSTOLIC BLOOD PRESSURE: 141 MMHG | DIASTOLIC BLOOD PRESSURE: 77 MMHG

## 2021-09-27 LAB
ANION GAP SERPL CALC-SCNC: 5 MMOL/L (ref 5–15)
BASOPHILS # BLD AUTO: 0 X10^3/UL (ref 0–0.1)
BASOPHILS NFR BLD AUTO: 1 % (ref 0–1)
CALCIUM SERPL-MCNC: 7.6 MG/DL (ref 8.5–10.1)
CHLORIDE SERPL-SCNC: 108 MMOL/L (ref 98–107)
CREAT SERPL-MCNC: 0.4 MG/DL (ref 0.7–1.3)
EOSINOPHIL # BLD AUTO: 0.1 X10^3/UL (ref 0–0.4)
EOSINOPHIL NFR BLD AUTO: 2 % (ref 1–7)
ERYTHROCYTE [DISTWIDTH] IN BLOOD BY AUTOMATED COUNT: 16.5 % (ref 9.4–14.8)
LYMPHOCYTES # BLD AUTO: 0.6 X10^3/UL (ref 1–3.4)
LYMPHOCYTES NFR BLD AUTO: 12 % (ref 22–44)
MCH RBC QN AUTO: 30.5 PG (ref 27.5–34.5)
MCHC RBC AUTO-ENTMCNC: 34.8 G/DL (ref 33.2–36.2)
MONOCYTES # BLD AUTO: 0.4 X10^3/UL (ref 0.2–0.8)
MONOCYTES NFR BLD AUTO: 8 % (ref 2–9)
NEUTROPHILS # BLD AUTO: 4.3 X10^3/UL (ref 1.8–6.8)
NEUTROPHILS NFR BLD AUTO: 78 % (ref 42–75)
PLATELET # BLD AUTO: 191 X10^3/UL (ref 130–400)
PMV BLD AUTO: 7.6 FL (ref 7.4–10.4)
RBC # BLD AUTO: 2.56 X10^6/UL (ref 4.38–5.82)

## 2021-09-27 RX ADMIN — PSYLLIUM HUSK SCH PACKET: 3.4 GRANULE ORAL at 09:00

## 2021-09-27 RX ADMIN — PANTOPRAZOLE SODIUM SCH MG: 40 INJECTION, POWDER, FOR SOLUTION INTRAVENOUS at 13:13

## 2021-09-27 RX ADMIN — CEFTRIAXONE SCH MLS/HR: 2 INJECTION, POWDER, FOR SOLUTION INTRAMUSCULAR; INTRAVENOUS at 09:02

## 2021-09-27 RX ADMIN — LEVETIRACETAM SCH MLS/HR: 100 INJECTION, SOLUTION, CONCENTRATE INTRAVENOUS at 10:19

## 2021-09-27 RX ADMIN — LIOTHYRONINE SODIUM SCH MCG: 5 TABLET ORAL at 09:01

## 2021-09-27 RX ADMIN — PANTOPRAZOLE SODIUM SCH MG: 40 INJECTION, POWDER, FOR SOLUTION INTRAVENOUS at 01:03

## 2021-09-27 RX ADMIN — TAMSULOSIN HYDROCHLORIDE SCH MG: 0.4 CAPSULE ORAL at 09:01
